# Patient Record
Sex: FEMALE | Race: WHITE | NOT HISPANIC OR LATINO | Employment: OTHER | URBAN - METROPOLITAN AREA
[De-identification: names, ages, dates, MRNs, and addresses within clinical notes are randomized per-mention and may not be internally consistent; named-entity substitution may affect disease eponyms.]

---

## 2017-06-26 ENCOUNTER — OFFICE VISIT (OUTPATIENT)
Dept: INTERNAL MEDICINE | Facility: CLINIC | Age: 82
End: 2017-06-26
Payer: MEDICARE

## 2017-06-26 VITALS
OXYGEN SATURATION: 95 % | TEMPERATURE: 98 F | HEART RATE: 80 BPM | DIASTOLIC BLOOD PRESSURE: 94 MMHG | BODY MASS INDEX: 29.18 KG/M2 | HEIGHT: 66 IN | SYSTOLIC BLOOD PRESSURE: 162 MMHG | WEIGHT: 181.56 LBS

## 2017-06-26 DIAGNOSIS — H91.93 BILATERAL HEARING LOSS, UNSPECIFIED HEARING LOSS TYPE: ICD-10-CM

## 2017-06-26 DIAGNOSIS — E03.8 SUBCLINICAL HYPOTHYROIDISM: ICD-10-CM

## 2017-06-26 DIAGNOSIS — Z76.89 ENCOUNTER TO ESTABLISH CARE: Primary | ICD-10-CM

## 2017-06-26 DIAGNOSIS — R03.0 ELEVATED BLOOD PRESSURE READING: ICD-10-CM

## 2017-06-26 DIAGNOSIS — G25.0 BENIGN HEAD TREMOR: ICD-10-CM

## 2017-06-26 DIAGNOSIS — I10 ESSENTIAL HYPERTENSION: ICD-10-CM

## 2017-06-26 LAB
ALBUMIN SERPL BCP-MCNC: 3.8 G/DL
ALP SERPL-CCNC: 59 U/L
ALT SERPL W/O P-5'-P-CCNC: 14 U/L
ANION GAP SERPL CALC-SCNC: 8 MMOL/L
AST SERPL-CCNC: 24 U/L
BACTERIA #/AREA URNS AUTO: NORMAL /HPF
BASOPHILS # BLD AUTO: 0.05 K/UL
BASOPHILS NFR BLD: 0.7 %
BILIRUB SERPL-MCNC: 0.4 MG/DL
BILIRUB UR QL STRIP: NEGATIVE
BUN SERPL-MCNC: 15 MG/DL
CALCIUM SERPL-MCNC: 9.2 MG/DL
CHLORIDE SERPL-SCNC: 109 MMOL/L
CHOLEST/HDLC SERPL: 2.4 {RATIO}
CLARITY UR REFRACT.AUTO: CLEAR
CO2 SERPL-SCNC: 23 MMOL/L
COLOR UR AUTO: YELLOW
CREAT SERPL-MCNC: 0.9 MG/DL
DIFFERENTIAL METHOD: ABNORMAL
EOSINOPHIL # BLD AUTO: 0.1 K/UL
EOSINOPHIL NFR BLD: 1.9 %
ERYTHROCYTE [DISTWIDTH] IN BLOOD BY AUTOMATED COUNT: 14.9 %
EST. GFR  (AFRICAN AMERICAN): >60 ML/MIN/1.73 M^2
EST. GFR  (NON AFRICAN AMERICAN): 57.3 ML/MIN/1.73 M^2
GLUCOSE SERPL-MCNC: 97 MG/DL
GLUCOSE UR QL STRIP: NEGATIVE
HCT VFR BLD AUTO: 43.2 %
HDL/CHOLESTEROL RATIO: 41.2 %
HDLC SERPL-MCNC: 177 MG/DL
HDLC SERPL-MCNC: 73 MG/DL
HGB BLD-MCNC: 14 G/DL
HGB UR QL STRIP: NEGATIVE
KETONES UR QL STRIP: NEGATIVE
LDLC SERPL CALC-MCNC: 89.2 MG/DL
LEUKOCYTE ESTERASE UR QL STRIP: ABNORMAL
LYMPHOCYTES # BLD AUTO: 1.5 K/UL
LYMPHOCYTES NFR BLD: 21 %
MCH RBC QN AUTO: 29.9 PG
MCHC RBC AUTO-ENTMCNC: 32.4 %
MCV RBC AUTO: 92 FL
MICROSCOPIC COMMENT: NORMAL
MONOCYTES # BLD AUTO: 0.4 K/UL
MONOCYTES NFR BLD: 5.9 %
NEUTROPHILS # BLD AUTO: 4.9 K/UL
NEUTROPHILS NFR BLD: 70.4 %
NITRITE UR QL STRIP: NEGATIVE
NONHDLC SERPL-MCNC: 104 MG/DL
PH UR STRIP: 6 [PH] (ref 5–8)
PLATELET # BLD AUTO: 194 K/UL
PMV BLD AUTO: 11.5 FL
POTASSIUM SERPL-SCNC: 4.4 MMOL/L
PROT SERPL-MCNC: 7 G/DL
PROT UR QL STRIP: NEGATIVE
RBC # BLD AUTO: 4.68 M/UL
RBC #/AREA URNS AUTO: 1 /HPF (ref 0–4)
SODIUM SERPL-SCNC: 140 MMOL/L
SP GR UR STRIP: 1.01 (ref 1–1.03)
SQUAMOUS #/AREA URNS AUTO: 2 /HPF
T4 FREE SERPL-MCNC: 0.83 NG/DL
TRIGL SERPL-MCNC: 74 MG/DL
TSH SERPL DL<=0.005 MIU/L-ACNC: 3.33 UIU/ML
URN SPEC COLLECT METH UR: ABNORMAL
UROBILINOGEN UR STRIP-ACNC: NEGATIVE EU/DL
WBC # BLD AUTO: 6.92 K/UL
WBC #/AREA URNS AUTO: 4 /HPF (ref 0–5)

## 2017-06-26 PROCEDURE — 80061 LIPID PANEL: CPT

## 2017-06-26 PROCEDURE — 99213 OFFICE O/P EST LOW 20 MIN: CPT | Mod: PBBFAC,PO | Performed by: FAMILY MEDICINE

## 2017-06-26 PROCEDURE — 80053 COMPREHEN METABOLIC PANEL: CPT

## 2017-06-26 PROCEDURE — 99214 OFFICE O/P EST MOD 30 MIN: CPT | Mod: S$PBB,,, | Performed by: FAMILY MEDICINE

## 2017-06-26 PROCEDURE — 84439 ASSAY OF FREE THYROXINE: CPT

## 2017-06-26 PROCEDURE — 36415 COLL VENOUS BLD VENIPUNCTURE: CPT

## 2017-06-26 PROCEDURE — 1126F AMNT PAIN NOTED NONE PRSNT: CPT | Mod: ,,, | Performed by: FAMILY MEDICINE

## 2017-06-26 PROCEDURE — 1159F MED LIST DOCD IN RCRD: CPT | Mod: ,,, | Performed by: FAMILY MEDICINE

## 2017-06-26 PROCEDURE — 85025 COMPLETE CBC W/AUTO DIFF WBC: CPT

## 2017-06-26 PROCEDURE — 99999 PR PBB SHADOW E&M-EST. PATIENT-LVL III: CPT | Mod: PBBFAC,,, | Performed by: FAMILY MEDICINE

## 2017-06-26 PROCEDURE — 81001 URINALYSIS AUTO W/SCOPE: CPT

## 2017-06-26 PROCEDURE — 84443 ASSAY THYROID STIM HORMONE: CPT

## 2017-06-26 RX ORDER — ACETAMINOPHEN 325 MG/1
325 TABLET ORAL EVERY 6 HOURS PRN
COMMUNITY

## 2017-06-26 NOTE — PROGRESS NOTES
Subjective:       Patient ID: Francisca Pretty is a 88 y.o. female.    Chief Complaint: No chief complaint on file.    Establish care.  Medical history includes hypertension, subclinical hypothyroidism, hearing loss, head  tremor.  She was prescribed lisinopril 10 mg once a day last visit 1 year ago.  She reports she has not started medication.  She also has a tremor that is stable and does not seem because much of a problem.      Review of Systems   Constitutional: Negative for activity change, appetite change, fatigue and unexpected weight change.   HENT: Positive for hearing loss. Negative for congestion, dental problem, ear pain, sneezing, sore throat, tinnitus and trouble swallowing.    Eyes: Negative for pain, redness, itching and visual disturbance.   Respiratory: Negative for cough, chest tightness, shortness of breath and wheezing.    Cardiovascular: Negative for chest pain, palpitations and leg swelling.   Gastrointestinal: Negative for abdominal distention, abdominal pain, blood in stool, constipation, diarrhea, nausea and vomiting.   Genitourinary: Negative for difficulty urinating, dysuria, frequency, hematuria and urgency.   Musculoskeletal: Positive for arthralgias and neck stiffness. Negative for back pain, joint swelling and neck pain.        Occasional joint discomfort.  occ neck stiffness at bedtime.   Skin: Negative for rash and wound.        She is followed on a regular basis by dermatology   Neurological: Negative for dizziness, tremors, syncope, weakness, light-headedness, numbness and headaches.   Hematological: Negative for adenopathy. Does not bruise/bleed easily.   Psychiatric/Behavioral: Negative for agitation, dysphoric mood and sleep disturbance. The patient is not nervous/anxious.        Objective:      Physical Exam   Constitutional: She is oriented to person, place, and time. She appears well-developed and well-nourished.   HENT:   Right Ear: External ear normal.   Left Ear:  External ear normal.   Nose: Nose normal.   Mouth/Throat: Oropharynx is clear and moist.   Eyes: Conjunctivae and EOM are normal. Pupils are equal, round, and reactive to light.   Neck: Normal range of motion. Neck supple. No thyromegaly present.   Cardiovascular: Normal rate, regular rhythm and normal heart sounds.    No murmur heard.  2+ carotid pulses   Pulmonary/Chest: Effort normal and breath sounds normal. She has no wheezes. She has no rales.   Abdominal: Soft. Bowel sounds are normal. She exhibits no distension and no mass. There is no tenderness.   Musculoskeletal: She exhibits no edema or tenderness.   Lymphadenopathy:     She has no cervical adenopathy.   Neurological: She is alert and oriented to person, place, and time. She has normal reflexes. She displays normal reflexes. No cranial nerve deficit. She exhibits normal muscle tone. Coordination normal.   Mild head tremor intermittent during  examination   Skin: Skin is warm and dry. No rash noted.   Psychiatric: She has a normal mood and affect. Her behavior is normal. Judgment and thought content normal.       Appointment on 01/20/2016   Component Date Value Ref Range Status    Specimen UA 01/20/2016 Urine, Unspecified   Final    Color, UA 01/20/2016 Yellow  Yellow, Straw, Domitila Final    Appearance, UA 01/20/2016 Clear  Clear Final    pH, UA 01/20/2016 6.0  5.0 - 8.0 Final    Specific Gravity, UA 01/20/2016 1.015  1.005 - 1.030 Final    Protein, UA 01/20/2016 Negative  Negative Final    Glucose, UA 01/20/2016 Negative  Negative Final    Ketones, UA 01/20/2016 Negative  Negative Final    Bilirubin (UA) 01/20/2016 Negative  Negative Final    Occult Blood UA 01/20/2016 Negative  Negative Final    Nitrite, UA 01/20/2016 Negative  Negative Final    Urobilinogen, UA 01/20/2016 Negative  <2.0 EU/dL Final    Leukocytes, UA 01/20/2016 3+* Negative Final    WBC, UA 01/20/2016 2  0 - 5 /hpf Final    Squam Epithel, UA 01/20/2016 2  /hpf Final     Non-Squam Epith 01/20/2016 2* <1/hpf /hpf Final    Microscopic Comment 01/20/2016 SEE COMMENT   Final     Assessment:       1. Essential hypertension    2. Subclinical hypothyroidism        Plan:   She declined any further pneumococcal immunization.  She reports she was ill after the last immunization for pneumococcus.  CBC urinalysis CMP lipids TSH free T4 was ordered.  Follow-up in 2 weeks for blood pressure recheck.  Discussed starting lisinopril 10 mg daily      Essential hypertension  -     CBC auto differential  -     Urinalysis  -     Comprehensive metabolic panel  -     Lipid panel  -     TSH    Subclinical hypothyroidism  -     T4, free

## 2017-06-27 ENCOUNTER — TELEPHONE (OUTPATIENT)
Dept: INTERNAL MEDICINE | Facility: CLINIC | Age: 82
End: 2017-06-27

## 2017-06-27 NOTE — TELEPHONE ENCOUNTER
----- Message from Fermín Serrano MD sent at 6/27/2017  7:44 AM CDT -----  Decreased renal function has improved from previous test.  She needs to avoid naproxen and ibuprofen.  Tylenol  is okay.  Rest of lab is normal

## 2017-07-10 ENCOUNTER — OFFICE VISIT (OUTPATIENT)
Dept: INTERNAL MEDICINE | Facility: CLINIC | Age: 82
End: 2017-07-10
Payer: MEDICARE

## 2017-07-10 VITALS
OXYGEN SATURATION: 95 % | DIASTOLIC BLOOD PRESSURE: 90 MMHG | HEART RATE: 72 BPM | WEIGHT: 178.44 LBS | TEMPERATURE: 98 F | SYSTOLIC BLOOD PRESSURE: 150 MMHG | BODY MASS INDEX: 28.68 KG/M2 | HEIGHT: 66 IN

## 2017-07-10 DIAGNOSIS — R03.0 ELEVATED BLOOD PRESSURE READING: ICD-10-CM

## 2017-07-10 DIAGNOSIS — I10 ESSENTIAL HYPERTENSION: Primary | ICD-10-CM

## 2017-07-10 PROCEDURE — 99213 OFFICE O/P EST LOW 20 MIN: CPT | Mod: PBBFAC,PO | Performed by: FAMILY MEDICINE

## 2017-07-10 PROCEDURE — 99999 PR PBB SHADOW E&M-EST. PATIENT-LVL III: CPT | Mod: PBBFAC,,, | Performed by: FAMILY MEDICINE

## 2017-07-10 PROCEDURE — 1159F MED LIST DOCD IN RCRD: CPT | Mod: ,,, | Performed by: FAMILY MEDICINE

## 2017-07-10 PROCEDURE — 1126F AMNT PAIN NOTED NONE PRSNT: CPT | Mod: ,,, | Performed by: FAMILY MEDICINE

## 2017-07-10 PROCEDURE — 99213 OFFICE O/P EST LOW 20 MIN: CPT | Mod: S$PBB,,, | Performed by: FAMILY MEDICINE

## 2017-07-10 NOTE — PROGRESS NOTES
Subjective:       Patient ID: Francisca Pretty is a 88 y.o. female.    Chief Complaint: Follow-up    Follow-up hypertension.  Blood pressures at home have mostly been in 1 4150 systolic range.  She is on lisinopril 10 mg a day.  She denies headaches chest pain palpitations or edema.      Review of Systems   Constitutional: Negative for appetite change, fatigue and unexpected weight change.   Respiratory: Negative for cough, shortness of breath and wheezing.    Cardiovascular: Negative for chest pain, palpitations and leg swelling.   Gastrointestinal: Negative for abdominal pain.   Genitourinary: Negative for difficulty urinating.   Neurological: Negative for headaches.       Objective:      Physical Exam   Constitutional: She is oriented to person, place, and time. She appears well-developed and well-nourished. No distress.   Neck: Neck supple. No thyromegaly present.   Cardiovascular: Normal rate, regular rhythm and normal heart sounds.    No murmur heard.  Pulmonary/Chest: Effort normal and breath sounds normal. No respiratory distress. She has no wheezes.   Abdominal: Soft. Bowel sounds are normal. She exhibits no mass. There is no tenderness.   Lymphadenopathy:     She has no cervical adenopathy.   Neurological: She is alert and oriented to person, place, and time.       Office Visit on 06/26/2017   Component Date Value Ref Range Status    WBC 06/26/2017 6.92  3.90 - 12.70 K/uL Final    RBC 06/26/2017 4.68  4.00 - 5.40 M/uL Final    Hemoglobin 06/26/2017 14.0  12.0 - 16.0 g/dL Final    Hematocrit 06/26/2017 43.2  37.0 - 48.5 % Final    MCV 06/26/2017 92  82 - 98 fL Final    MCH 06/26/2017 29.9  27.0 - 31.0 pg Final    MCHC 06/26/2017 32.4  32.0 - 36.0 % Final    RDW 06/26/2017 14.9* 11.5 - 14.5 % Final    Platelets 06/26/2017 194  150 - 350 K/uL Final    MPV 06/26/2017 11.5  9.2 - 12.9 fL Final    Gran # 06/26/2017 4.9  1.8 - 7.7 K/uL Final    Lymph # 06/26/2017 1.5  1.0 - 4.8 K/uL Final    Mono  # 06/26/2017 0.4  0.3 - 1.0 K/uL Final    Eos # 06/26/2017 0.1  0.0 - 0.5 K/uL Final    Baso # 06/26/2017 0.05  0.00 - 0.20 K/uL Final    Gran% 06/26/2017 70.4  38.0 - 73.0 % Final    Lymph% 06/26/2017 21.0  18.0 - 48.0 % Final    Mono% 06/26/2017 5.9  4.0 - 15.0 % Final    Eosinophil% 06/26/2017 1.9  0.0 - 8.0 % Final    Basophil% 06/26/2017 0.7  0.0 - 1.9 % Final    Differential Method 06/26/2017 Automated   Final    Specimen UA 06/26/2017 Urine, Clean Catch   Final    Color, UA 06/26/2017 Yellow  Yellow, Straw, Domitila Final    Appearance, UA 06/26/2017 Clear  Clear Final    pH, UA 06/26/2017 6.0  5.0 - 8.0 Final    Specific Gravity, UA 06/26/2017 1.015  1.005 - 1.030 Final    Protein, UA 06/26/2017 Negative  Negative Final    Glucose, UA 06/26/2017 Negative  Negative Final    Ketones, UA 06/26/2017 Negative  Negative Final    Bilirubin (UA) 06/26/2017 Negative  Negative Final    Occult Blood UA 06/26/2017 Negative  Negative Final    Nitrite, UA 06/26/2017 Negative  Negative Final    Urobilinogen, UA 06/26/2017 Negative  <2.0 EU/dL Final    Leukocytes, UA 06/26/2017 2+* Negative Final    Sodium 06/26/2017 140  136 - 145 mmol/L Final    Potassium 06/26/2017 4.4  3.5 - 5.1 mmol/L Final    Chloride 06/26/2017 109  95 - 110 mmol/L Final    CO2 06/26/2017 23  23 - 29 mmol/L Final    Glucose 06/26/2017 97  70 - 110 mg/dL Final    BUN, Bld 06/26/2017 15  8 - 23 mg/dL Final    Creatinine 06/26/2017 0.9  0.5 - 1.4 mg/dL Final    Calcium 06/26/2017 9.2  8.7 - 10.5 mg/dL Final    Total Protein 06/26/2017 7.0  6.0 - 8.4 g/dL Final    Albumin 06/26/2017 3.8  3.5 - 5.2 g/dL Final    Total Bilirubin 06/26/2017 0.4  0.1 - 1.0 mg/dL Final    Alkaline Phosphatase 06/26/2017 59  55 - 135 U/L Final    AST 06/26/2017 24  10 - 40 U/L Final    ALT 06/26/2017 14  10 - 44 U/L Final    Anion Gap 06/26/2017 8  8 - 16 mmol/L Final    eGFR if African American 06/26/2017 >60.0  >60 mL/min/1.73 m^2 Final     eGFR if non African American 06/26/2017 57.3* >60 mL/min/1.73 m^2 Final    Cholesterol 06/26/2017 177  120 - 199 mg/dL Final    Triglycerides 06/26/2017 74  30 - 150 mg/dL Final    HDL 06/26/2017 73  40 - 75 mg/dL Final    LDL Cholesterol 06/26/2017 89.2  63.0 - 159.0 mg/dL Final    HDL/Chol Ratio 06/26/2017 41.2  20.0 - 50.0 % Final    Total Cholesterol/HDL Ratio 06/26/2017 2.4  2.0 - 5.0 Final    Non-HDL Cholesterol 06/26/2017 104  mg/dL Final    TSH 06/26/2017 3.326  0.400 - 4.000 uIU/mL Final    Free T4 06/26/2017 0.83  0.71 - 1.51 ng/dL Final    RBC, UA 06/26/2017 1  0 - 4 /hpf Final    WBC, UA 06/26/2017 4  0 - 5 /hpf Final    Bacteria, UA 06/26/2017 Rare  None-Occ /hpf Final    Squam Epithel, UA 06/26/2017 2  /hpf Final    Microscopic Comment 06/26/2017 SEE COMMENT   Final     Assessment:       No diagnosis found.    Plan:     Lisinopril 20 mg a day and follow-up in one month.  Continue home blood pressure monitor    There are no diagnoses linked to this encounter.

## 2017-08-11 ENCOUNTER — OFFICE VISIT (OUTPATIENT)
Dept: INTERNAL MEDICINE | Facility: CLINIC | Age: 82
End: 2017-08-11
Payer: MEDICARE

## 2017-08-11 VITALS
SYSTOLIC BLOOD PRESSURE: 122 MMHG | WEIGHT: 178.44 LBS | OXYGEN SATURATION: 96 % | TEMPERATURE: 98 F | BODY MASS INDEX: 28.8 KG/M2 | HEART RATE: 82 BPM | DIASTOLIC BLOOD PRESSURE: 88 MMHG

## 2017-08-11 DIAGNOSIS — I10 ESSENTIAL HYPERTENSION: Primary | ICD-10-CM

## 2017-08-11 DIAGNOSIS — R05.9 COUGH: ICD-10-CM

## 2017-08-11 PROCEDURE — 99999 PR PBB SHADOW E&M-EST. PATIENT-LVL III: CPT | Mod: PBBFAC,,, | Performed by: FAMILY MEDICINE

## 2017-08-11 PROCEDURE — 99213 OFFICE O/P EST LOW 20 MIN: CPT | Mod: PBBFAC,PO | Performed by: FAMILY MEDICINE

## 2017-08-11 PROCEDURE — 1159F MED LIST DOCD IN RCRD: CPT | Mod: ,,, | Performed by: FAMILY MEDICINE

## 2017-08-11 PROCEDURE — 3008F BODY MASS INDEX DOCD: CPT | Mod: ,,, | Performed by: FAMILY MEDICINE

## 2017-08-11 PROCEDURE — 99213 OFFICE O/P EST LOW 20 MIN: CPT | Mod: S$PBB,,, | Performed by: FAMILY MEDICINE

## 2017-08-11 PROCEDURE — 1125F AMNT PAIN NOTED PAIN PRSNT: CPT | Mod: ,,, | Performed by: FAMILY MEDICINE

## 2017-08-11 NOTE — PROGRESS NOTES
Subjective:       Patient ID: Francisca Pretty is a 88 y.o. female.    Chief Complaint: 1 month f/u (HBP)    Follow-up hypertension.  She is on lisinopril 20 mg a day.she has an occasional dry cough with occasional sneezing sometimes precipitated by eating  chocolate.  She denies difficulty breathing chest pain palpitations or urticaria      Review of Systems   Constitutional: Negative for appetite change, fatigue and unexpected weight change.   HENT: Positive for sneezing.    Respiratory: Positive for cough. Negative for shortness of breath and wheezing.    Cardiovascular: Negative for chest pain, palpitations and leg swelling.   Gastrointestinal: Negative for abdominal pain.   Genitourinary: Negative for difficulty urinating.   Neurological: Negative for headaches.       Objective:      Physical Exam   Constitutional: She is oriented to person, place, and time. She appears well-developed and well-nourished. No distress.   Neck: Neck supple. No thyromegaly present.   Cardiovascular: Normal rate, regular rhythm and normal heart sounds.    No murmur heard.  Pulmonary/Chest: Effort normal and breath sounds normal. No respiratory distress. She has no wheezes.   Abdominal: Soft. Bowel sounds are normal. She exhibits no mass. There is no tenderness.   Lymphadenopathy:     She has no cervical adenopathy.   Neurological: She is alert and oriented to person, place, and time.       Office Visit on 06/26/2017   Component Date Value Ref Range Status    WBC 06/26/2017 6.92  3.90 - 12.70 K/uL Final    RBC 06/26/2017 4.68  4.00 - 5.40 M/uL Final    Hemoglobin 06/26/2017 14.0  12.0 - 16.0 g/dL Final    Hematocrit 06/26/2017 43.2  37.0 - 48.5 % Final    MCV 06/26/2017 92  82 - 98 fL Final    MCH 06/26/2017 29.9  27.0 - 31.0 pg Final    MCHC 06/26/2017 32.4  32.0 - 36.0 % Final    RDW 06/26/2017 14.9* 11.5 - 14.5 % Final    Platelets 06/26/2017 194  150 - 350 K/uL Final    MPV 06/26/2017 11.5  9.2 - 12.9 fL Final     Gran # 06/26/2017 4.9  1.8 - 7.7 K/uL Final    Lymph # 06/26/2017 1.5  1.0 - 4.8 K/uL Final    Mono # 06/26/2017 0.4  0.3 - 1.0 K/uL Final    Eos # 06/26/2017 0.1  0.0 - 0.5 K/uL Final    Baso # 06/26/2017 0.05  0.00 - 0.20 K/uL Final    Gran% 06/26/2017 70.4  38.0 - 73.0 % Final    Lymph% 06/26/2017 21.0  18.0 - 48.0 % Final    Mono% 06/26/2017 5.9  4.0 - 15.0 % Final    Eosinophil% 06/26/2017 1.9  0.0 - 8.0 % Final    Basophil% 06/26/2017 0.7  0.0 - 1.9 % Final    Differential Method 06/26/2017 Automated   Final    Specimen UA 06/26/2017 Urine, Clean Catch   Final    Color, UA 06/26/2017 Yellow  Yellow, Straw, Domitila Final    Appearance, UA 06/26/2017 Clear  Clear Final    pH, UA 06/26/2017 6.0  5.0 - 8.0 Final    Specific Gravity, UA 06/26/2017 1.015  1.005 - 1.030 Final    Protein, UA 06/26/2017 Negative  Negative Final    Glucose, UA 06/26/2017 Negative  Negative Final    Ketones, UA 06/26/2017 Negative  Negative Final    Bilirubin (UA) 06/26/2017 Negative  Negative Final    Occult Blood UA 06/26/2017 Negative  Negative Final    Nitrite, UA 06/26/2017 Negative  Negative Final    Urobilinogen, UA 06/26/2017 Negative  <2.0 EU/dL Final    Leukocytes, UA 06/26/2017 2+* Negative Final    Sodium 06/26/2017 140  136 - 145 mmol/L Final    Potassium 06/26/2017 4.4  3.5 - 5.1 mmol/L Final    Chloride 06/26/2017 109  95 - 110 mmol/L Final    CO2 06/26/2017 23  23 - 29 mmol/L Final    Glucose 06/26/2017 97  70 - 110 mg/dL Final    BUN, Bld 06/26/2017 15  8 - 23 mg/dL Final    Creatinine 06/26/2017 0.9  0.5 - 1.4 mg/dL Final    Calcium 06/26/2017 9.2  8.7 - 10.5 mg/dL Final    Total Protein 06/26/2017 7.0  6.0 - 8.4 g/dL Final    Albumin 06/26/2017 3.8  3.5 - 5.2 g/dL Final    Total Bilirubin 06/26/2017 0.4  0.1 - 1.0 mg/dL Final    Alkaline Phosphatase 06/26/2017 59  55 - 135 U/L Final    AST 06/26/2017 24  10 - 40 U/L Final    ALT 06/26/2017 14  10 - 44 U/L Final    Anion Gap  06/26/2017 8  8 - 16 mmol/L Final    eGFR if African American 06/26/2017 >60.0  >60 mL/min/1.73 m^2 Final    eGFR if non African American 06/26/2017 57.3* >60 mL/min/1.73 m^2 Final    Cholesterol 06/26/2017 177  120 - 199 mg/dL Final    Triglycerides 06/26/2017 74  30 - 150 mg/dL Final    HDL 06/26/2017 73  40 - 75 mg/dL Final    LDL Cholesterol 06/26/2017 89.2  63.0 - 159.0 mg/dL Final    HDL/Chol Ratio 06/26/2017 41.2  20.0 - 50.0 % Final    Total Cholesterol/HDL Ratio 06/26/2017 2.4  2.0 - 5.0 Final    Non-HDL Cholesterol 06/26/2017 104  mg/dL Final    TSH 06/26/2017 3.326  0.400 - 4.000 uIU/mL Final    Free T4 06/26/2017 0.83  0.71 - 1.51 ng/dL Final    RBC, UA 06/26/2017 1  0 - 4 /hpf Final    WBC, UA 06/26/2017 4  0 - 5 /hpf Final    Bacteria, UA 06/26/2017 Rare  None-Occ /hpf Final    Squam Epithel, UA 06/26/2017 2  /hpf Final    Microscopic Comment 06/26/2017 SEE COMMENT   Final     Assessment:       No diagnosis found.    Plan:     Blood pressure is controlled we'll continue lisinopril.  Consider Allegra for nasal congestion and  cough.  Symptoms sound more ALLERGIC than lisinopril side effect.  Follow-up in 3 month    There are no diagnoses linked to this encounter.

## 2017-08-29 ENCOUNTER — OFFICE VISIT (OUTPATIENT)
Dept: INTERNAL MEDICINE | Facility: CLINIC | Age: 82
End: 2017-08-29
Payer: MEDICARE

## 2017-08-29 VITALS
TEMPERATURE: 98 F | OXYGEN SATURATION: 97 % | HEIGHT: 66 IN | DIASTOLIC BLOOD PRESSURE: 82 MMHG | WEIGHT: 177.25 LBS | SYSTOLIC BLOOD PRESSURE: 132 MMHG | HEART RATE: 77 BPM | BODY MASS INDEX: 28.49 KG/M2

## 2017-08-29 DIAGNOSIS — M19.011 PRIMARY OSTEOARTHRITIS OF RIGHT SHOULDER: Primary | ICD-10-CM

## 2017-08-29 DIAGNOSIS — I10 ESSENTIAL HYPERTENSION: ICD-10-CM

## 2017-08-29 PROBLEM — R03.0 ELEVATED BLOOD PRESSURE READING: Status: RESOLVED | Noted: 2017-06-26 | Resolved: 2017-08-29

## 2017-08-29 PROCEDURE — 99213 OFFICE O/P EST LOW 20 MIN: CPT | Mod: S$PBB,,, | Performed by: FAMILY MEDICINE

## 2017-08-29 PROCEDURE — 99214 OFFICE O/P EST MOD 30 MIN: CPT | Mod: PBBFAC,PO | Performed by: FAMILY MEDICINE

## 2017-08-29 PROCEDURE — 1125F AMNT PAIN NOTED PAIN PRSNT: CPT | Mod: ,,, | Performed by: FAMILY MEDICINE

## 2017-08-29 PROCEDURE — 99999 PR PBB SHADOW E&M-EST. PATIENT-LVL IV: CPT | Mod: PBBFAC,,, | Performed by: FAMILY MEDICINE

## 2017-08-29 PROCEDURE — 1159F MED LIST DOCD IN RCRD: CPT | Mod: ,,, | Performed by: FAMILY MEDICINE

## 2017-08-29 PROCEDURE — 96372 THER/PROPH/DIAG INJ SC/IM: CPT | Mod: PBBFAC,PO

## 2017-08-29 RX ORDER — DEXAMETHASONE SODIUM PHOSPHATE 100 MG/10ML
10 INJECTION INTRAMUSCULAR; INTRAVENOUS ONCE
Status: COMPLETED | OUTPATIENT
Start: 2017-08-29 | End: 2017-08-29

## 2017-08-29 RX ORDER — MELOXICAM 15 MG/1
15 TABLET ORAL DAILY
Qty: 30 TABLET | Refills: 0 | Status: SHIPPED | OUTPATIENT
Start: 2017-08-29 | End: 2017-11-08

## 2017-08-29 RX ADMIN — DEXAMETHASONE SODIUM PHOSPHATE 10 MG: 10 INJECTION INTRAMUSCULAR; INTRAVENOUS at 10:08

## 2017-08-29 NOTE — PROGRESS NOTES
Subjective:       Patient ID: Francisca Pretty is a 88 y.o. female.    Chief Complaint: Shoulder Pain    6 weeks duration of right more than left shoulder pain.  The onset was about the time she started helping her  in regards to his recent hospital admission.  She reports her shoulders hurt in the morning when she gets up and better after moving around.  She also reports that water aerobics with range of motion seems to relieve the discomfort.  She gets temporary relief with Tylenol.  She denies any trauma.      Shoulder Pain    Pertinent negatives include no headaches or numbness.     Review of Systems   Constitutional: Negative for chills and fatigue.   Respiratory: Negative for cough and shortness of breath.    Cardiovascular: Negative for chest pain and palpitations.   Gastrointestinal: Negative for abdominal pain.   Musculoskeletal: Positive for arthralgias.   Neurological: Negative for weakness, numbness and headaches.       Objective:      Physical Exam   Constitutional: She appears well-developed and well-nourished. No distress.   Cardiovascular: Normal rate and regular rhythm.    Pulmonary/Chest: Effort normal and breath sounds normal.   Musculoskeletal:   Moderate pain with range of motion of her right shoulder.  She has full range of motion.  There is no direct tenderness.  No bone deformity or crepitus.  She has some discomfort with range of motion of the left shoulder.  Range of motion is full.       Office Visit on 06/26/2017   Component Date Value Ref Range Status    WBC 06/26/2017 6.92  3.90 - 12.70 K/uL Final    RBC 06/26/2017 4.68  4.00 - 5.40 M/uL Final    Hemoglobin 06/26/2017 14.0  12.0 - 16.0 g/dL Final    Hematocrit 06/26/2017 43.2  37.0 - 48.5 % Final    MCV 06/26/2017 92  82 - 98 fL Final    MCH 06/26/2017 29.9  27.0 - 31.0 pg Final    MCHC 06/26/2017 32.4  32.0 - 36.0 % Final    RDW 06/26/2017 14.9* 11.5 - 14.5 % Final    Platelets 06/26/2017 194  150 - 350 K/uL Final     MPV 06/26/2017 11.5  9.2 - 12.9 fL Final    Gran # 06/26/2017 4.9  1.8 - 7.7 K/uL Final    Lymph # 06/26/2017 1.5  1.0 - 4.8 K/uL Final    Mono # 06/26/2017 0.4  0.3 - 1.0 K/uL Final    Eos # 06/26/2017 0.1  0.0 - 0.5 K/uL Final    Baso # 06/26/2017 0.05  0.00 - 0.20 K/uL Final    Gran% 06/26/2017 70.4  38.0 - 73.0 % Final    Lymph% 06/26/2017 21.0  18.0 - 48.0 % Final    Mono% 06/26/2017 5.9  4.0 - 15.0 % Final    Eosinophil% 06/26/2017 1.9  0.0 - 8.0 % Final    Basophil% 06/26/2017 0.7  0.0 - 1.9 % Final    Differential Method 06/26/2017 Automated   Final    Specimen UA 06/26/2017 Urine, Clean Catch   Final    Color, UA 06/26/2017 Yellow  Yellow, Straw, Domitila Final    Appearance, UA 06/26/2017 Clear  Clear Final    pH, UA 06/26/2017 6.0  5.0 - 8.0 Final    Specific Gravity, UA 06/26/2017 1.015  1.005 - 1.030 Final    Protein, UA 06/26/2017 Negative  Negative Final    Glucose, UA 06/26/2017 Negative  Negative Final    Ketones, UA 06/26/2017 Negative  Negative Final    Bilirubin (UA) 06/26/2017 Negative  Negative Final    Occult Blood UA 06/26/2017 Negative  Negative Final    Nitrite, UA 06/26/2017 Negative  Negative Final    Urobilinogen, UA 06/26/2017 Negative  <2.0 EU/dL Final    Leukocytes, UA 06/26/2017 2+* Negative Final    Sodium 06/26/2017 140  136 - 145 mmol/L Final    Potassium 06/26/2017 4.4  3.5 - 5.1 mmol/L Final    Chloride 06/26/2017 109  95 - 110 mmol/L Final    CO2 06/26/2017 23  23 - 29 mmol/L Final    Glucose 06/26/2017 97  70 - 110 mg/dL Final    BUN, Bld 06/26/2017 15  8 - 23 mg/dL Final    Creatinine 06/26/2017 0.9  0.5 - 1.4 mg/dL Final    Calcium 06/26/2017 9.2  8.7 - 10.5 mg/dL Final    Total Protein 06/26/2017 7.0  6.0 - 8.4 g/dL Final    Albumin 06/26/2017 3.8  3.5 - 5.2 g/dL Final    Total Bilirubin 06/26/2017 0.4  0.1 - 1.0 mg/dL Final    Alkaline Phosphatase 06/26/2017 59  55 - 135 U/L Final    AST 06/26/2017 24  10 - 40 U/L Final    ALT  06/26/2017 14  10 - 44 U/L Final    Anion Gap 06/26/2017 8  8 - 16 mmol/L Final    eGFR if African American 06/26/2017 >60.0  >60 mL/min/1.73 m^2 Final    eGFR if non African American 06/26/2017 57.3* >60 mL/min/1.73 m^2 Final    Cholesterol 06/26/2017 177  120 - 199 mg/dL Final    Triglycerides 06/26/2017 74  30 - 150 mg/dL Final    HDL 06/26/2017 73  40 - 75 mg/dL Final    LDL Cholesterol 06/26/2017 89.2  63.0 - 159.0 mg/dL Final    HDL/Chol Ratio 06/26/2017 41.2  20.0 - 50.0 % Final    Total Cholesterol/HDL Ratio 06/26/2017 2.4  2.0 - 5.0 Final    Non-HDL Cholesterol 06/26/2017 104  mg/dL Final    TSH 06/26/2017 3.326  0.400 - 4.000 uIU/mL Final    Free T4 06/26/2017 0.83  0.71 - 1.51 ng/dL Final    RBC, UA 06/26/2017 1  0 - 4 /hpf Final    WBC, UA 06/26/2017 4  0 - 5 /hpf Final    Bacteria, UA 06/26/2017 Rare  None-Occ /hpf Final    Squam Epithel, UA 06/26/2017 2  /hpf Final    Microscopic Comment 06/26/2017 SEE COMMENT   Final     Assessment:       1. Primary osteoarthritis of right shoulder        Plan:     Probable degenerative arthritis.  Decadron injection followed by meloxicam.  Heat  twice a day to  Shoulder.  Continue water range of motion but avoid lifting and straining.  Follow-up in 2 weeks.  Blood pressure is normal and continued medication    Primary osteoarthritis of right shoulder  -     dexamethasone injection 10 mg; Inject 1 mL (10 mg total) into the muscle once.  -     meloxicam (MOBIC) 15 MG tablet; Take 1 tablet (15 mg total) by mouth once daily.  Dispense: 30 tablet; Refill: 0

## 2017-09-11 ENCOUNTER — APPOINTMENT (OUTPATIENT)
Dept: RADIOLOGY | Facility: HOSPITAL | Age: 82
End: 2017-09-11
Attending: FAMILY MEDICINE
Payer: MEDICARE

## 2017-09-11 ENCOUNTER — OFFICE VISIT (OUTPATIENT)
Dept: INTERNAL MEDICINE | Facility: CLINIC | Age: 82
End: 2017-09-11
Payer: MEDICARE

## 2017-09-11 VITALS
BODY MASS INDEX: 29.2 KG/M2 | WEIGHT: 181.69 LBS | SYSTOLIC BLOOD PRESSURE: 170 MMHG | DIASTOLIC BLOOD PRESSURE: 98 MMHG | TEMPERATURE: 98 F | OXYGEN SATURATION: 96 % | HEART RATE: 77 BPM | HEIGHT: 66 IN

## 2017-09-11 DIAGNOSIS — M19.011 PRIMARY OSTEOARTHRITIS OF BOTH SHOULDERS: ICD-10-CM

## 2017-09-11 DIAGNOSIS — M25.512 ACUTE PAIN OF LEFT SHOULDER: Primary | ICD-10-CM

## 2017-09-11 DIAGNOSIS — M19.012 PRIMARY OSTEOARTHRITIS OF BOTH SHOULDERS: ICD-10-CM

## 2017-09-11 PROCEDURE — 73030 X-RAY EXAM OF SHOULDER: CPT | Mod: TC,50,PO

## 2017-09-11 PROCEDURE — 20610 DRAIN/INJ JOINT/BURSA W/O US: CPT | Mod: PBBFAC,PO | Performed by: FAMILY MEDICINE

## 2017-09-11 PROCEDURE — 99999 PR PBB SHADOW E&M-EST. PATIENT-LVL III: CPT | Mod: PBBFAC,,, | Performed by: FAMILY MEDICINE

## 2017-09-11 PROCEDURE — 1125F AMNT PAIN NOTED PAIN PRSNT: CPT | Mod: ,,, | Performed by: FAMILY MEDICINE

## 2017-09-11 PROCEDURE — 99213 OFFICE O/P EST LOW 20 MIN: CPT | Mod: 25,S$PBB,, | Performed by: FAMILY MEDICINE

## 2017-09-11 PROCEDURE — 73030 X-RAY EXAM OF SHOULDER: CPT | Mod: 26,50,, | Performed by: RADIOLOGY

## 2017-09-11 PROCEDURE — 99213 OFFICE O/P EST LOW 20 MIN: CPT | Mod: PBBFAC,PO | Performed by: FAMILY MEDICINE

## 2017-09-11 PROCEDURE — 1159F MED LIST DOCD IN RCRD: CPT | Mod: ,,, | Performed by: FAMILY MEDICINE

## 2017-09-11 PROCEDURE — 20610 DRAIN/INJ JOINT/BURSA W/O US: CPT | Mod: S$PBB,LT,, | Performed by: FAMILY MEDICINE

## 2017-09-11 RX ORDER — METHYLPREDNISOLONE ACETATE 40 MG/ML
60 INJECTION, SUSPENSION INTRA-ARTICULAR; INTRALESIONAL; INTRAMUSCULAR; SOFT TISSUE ONCE
Status: COMPLETED | OUTPATIENT
Start: 2017-09-11 | End: 2017-09-11

## 2017-09-11 RX ADMIN — METHYLPREDNISOLONE ACETATE 60 MG: 40 INJECTION, SUSPENSION INTRALESIONAL; INTRAMUSCULAR; INTRASYNOVIAL; SOFT TISSUE at 12:09

## 2017-09-11 NOTE — PROGRESS NOTES
Subjective:       Patient ID: Francisca Pretty is a 88 y.o. female.    Chief Complaint: No chief complaint on file.    She continues with bilateral shoulder pain left side worse than right.  The onset was related to helping  her  is recovering from hip surgery.  She denies any traumatic events.  She reports that Decadron injection meloxicam did not help      Review of Systems   Constitutional: Negative for chills and fever.   Respiratory: Negative for cough.    Cardiovascular: Negative for chest pain and palpitations.   Musculoskeletal: Positive for arthralgias.       Objective:      Physical Exam   Constitutional: She appears well-developed and well-nourished. No distress.   Musculoskeletal:   Range of motion of both shoulders is full.  She has more pain on left shoulder with range of motion than right shoulder.  No bony deformity present.  No redness or swelling.       Office Visit on 06/26/2017   Component Date Value Ref Range Status    WBC 06/26/2017 6.92  3.90 - 12.70 K/uL Final    RBC 06/26/2017 4.68  4.00 - 5.40 M/uL Final    Hemoglobin 06/26/2017 14.0  12.0 - 16.0 g/dL Final    Hematocrit 06/26/2017 43.2  37.0 - 48.5 % Final    MCV 06/26/2017 92  82 - 98 fL Final    MCH 06/26/2017 29.9  27.0 - 31.0 pg Final    MCHC 06/26/2017 32.4  32.0 - 36.0 % Final    RDW 06/26/2017 14.9* 11.5 - 14.5 % Final    Platelets 06/26/2017 194  150 - 350 K/uL Final    MPV 06/26/2017 11.5  9.2 - 12.9 fL Final    Gran # 06/26/2017 4.9  1.8 - 7.7 K/uL Final    Lymph # 06/26/2017 1.5  1.0 - 4.8 K/uL Final    Mono # 06/26/2017 0.4  0.3 - 1.0 K/uL Final    Eos # 06/26/2017 0.1  0.0 - 0.5 K/uL Final    Baso # 06/26/2017 0.05  0.00 - 0.20 K/uL Final    Gran% 06/26/2017 70.4  38.0 - 73.0 % Final    Lymph% 06/26/2017 21.0  18.0 - 48.0 % Final    Mono% 06/26/2017 5.9  4.0 - 15.0 % Final    Eosinophil% 06/26/2017 1.9  0.0 - 8.0 % Final    Basophil% 06/26/2017 0.7  0.0 - 1.9 % Final    Differential Method  06/26/2017 Automated   Final    Specimen UA 06/26/2017 Urine, Clean Catch   Final    Color, UA 06/26/2017 Yellow  Yellow, Straw, Domitila Final    Appearance, UA 06/26/2017 Clear  Clear Final    pH, UA 06/26/2017 6.0  5.0 - 8.0 Final    Specific Gravity, UA 06/26/2017 1.015  1.005 - 1.030 Final    Protein, UA 06/26/2017 Negative  Negative Final    Glucose, UA 06/26/2017 Negative  Negative Final    Ketones, UA 06/26/2017 Negative  Negative Final    Bilirubin (UA) 06/26/2017 Negative  Negative Final    Occult Blood UA 06/26/2017 Negative  Negative Final    Nitrite, UA 06/26/2017 Negative  Negative Final    Urobilinogen, UA 06/26/2017 Negative  <2.0 EU/dL Final    Leukocytes, UA 06/26/2017 2+* Negative Final    Sodium 06/26/2017 140  136 - 145 mmol/L Final    Potassium 06/26/2017 4.4  3.5 - 5.1 mmol/L Final    Chloride 06/26/2017 109  95 - 110 mmol/L Final    CO2 06/26/2017 23  23 - 29 mmol/L Final    Glucose 06/26/2017 97  70 - 110 mg/dL Final    BUN, Bld 06/26/2017 15  8 - 23 mg/dL Final    Creatinine 06/26/2017 0.9  0.5 - 1.4 mg/dL Final    Calcium 06/26/2017 9.2  8.7 - 10.5 mg/dL Final    Total Protein 06/26/2017 7.0  6.0 - 8.4 g/dL Final    Albumin 06/26/2017 3.8  3.5 - 5.2 g/dL Final    Total Bilirubin 06/26/2017 0.4  0.1 - 1.0 mg/dL Final    Alkaline Phosphatase 06/26/2017 59  55 - 135 U/L Final    AST 06/26/2017 24  10 - 40 U/L Final    ALT 06/26/2017 14  10 - 44 U/L Final    Anion Gap 06/26/2017 8  8 - 16 mmol/L Final    eGFR if African American 06/26/2017 >60.0  >60 mL/min/1.73 m^2 Final    eGFR if non African American 06/26/2017 57.3* >60 mL/min/1.73 m^2 Final    Cholesterol 06/26/2017 177  120 - 199 mg/dL Final    Triglycerides 06/26/2017 74  30 - 150 mg/dL Final    HDL 06/26/2017 73  40 - 75 mg/dL Final    LDL Cholesterol 06/26/2017 89.2  63.0 - 159.0 mg/dL Final    HDL/Chol Ratio 06/26/2017 41.2  20.0 - 50.0 % Final    Total Cholesterol/HDL Ratio 06/26/2017 2.4  2.0 - 5.0  Final    Non-HDL Cholesterol 06/26/2017 104  mg/dL Final    TSH 06/26/2017 3.326  0.400 - 4.000 uIU/mL Final    Free T4 06/26/2017 0.83  0.71 - 1.51 ng/dL Final    RBC, UA 06/26/2017 1  0 - 4 /hpf Final    WBC, UA 06/26/2017 4  0 - 5 /hpf Final    Bacteria, UA 06/26/2017 Rare  None-Occ /hpf Final    Squam Epithel, UA 06/26/2017 2  /hpf Final    Microscopic Comment 06/26/2017 SEE COMMENT   Final     Assessment:       1. Primary osteoarthritis of both shoulders        Plan:   Xray shoulders  .  Alcohol Prep and injected left anterior shoulder 60 mg Depo-Medrol and 1 cc of Xylocaine.  This was done with no difficulty.  Recommend heat range of motion of both shoulders and follow-up in one week      Primary osteoarthritis of both shoulders  -     X-Ray Shoulder 2 or More views Bilateral; Future; Expected date: 09/11/2017

## 2017-09-18 ENCOUNTER — OFFICE VISIT (OUTPATIENT)
Dept: INTERNAL MEDICINE | Facility: CLINIC | Age: 82
End: 2017-09-18
Payer: MEDICARE

## 2017-09-18 VITALS
SYSTOLIC BLOOD PRESSURE: 154 MMHG | WEIGHT: 178 LBS | DIASTOLIC BLOOD PRESSURE: 89 MMHG | HEIGHT: 66 IN | BODY MASS INDEX: 28.61 KG/M2 | HEART RATE: 73 BPM | OXYGEN SATURATION: 96 % | TEMPERATURE: 98 F

## 2017-09-18 DIAGNOSIS — M19.011 PRIMARY OSTEOARTHRITIS OF BOTH SHOULDERS: Primary | ICD-10-CM

## 2017-09-18 DIAGNOSIS — M19.012 PRIMARY OSTEOARTHRITIS OF BOTH SHOULDERS: Primary | ICD-10-CM

## 2017-09-18 PROCEDURE — 99214 OFFICE O/P EST MOD 30 MIN: CPT | Mod: PBBFAC,PO | Performed by: FAMILY MEDICINE

## 2017-09-18 PROCEDURE — 1159F MED LIST DOCD IN RCRD: CPT | Mod: ,,, | Performed by: FAMILY MEDICINE

## 2017-09-18 PROCEDURE — 99999 PR PBB SHADOW E&M-EST. PATIENT-LVL IV: CPT | Mod: PBBFAC,,, | Performed by: FAMILY MEDICINE

## 2017-09-18 PROCEDURE — 99213 OFFICE O/P EST LOW 20 MIN: CPT | Mod: S$PBB,,, | Performed by: FAMILY MEDICINE

## 2017-09-18 PROCEDURE — 1125F AMNT PAIN NOTED PAIN PRSNT: CPT | Mod: ,,, | Performed by: FAMILY MEDICINE

## 2017-09-18 RX ORDER — ACETAMINOPHEN AND CODEINE PHOSPHATE 300; 30 MG/1; MG/1
1 TABLET ORAL 2 TIMES DAILY PRN
Qty: 30 TABLET | Refills: 0 | Status: SHIPPED | OUTPATIENT
Start: 2017-09-18 | End: 2017-11-08

## 2017-09-18 NOTE — PROGRESS NOTES
Subjective:       Patient ID: Francisca Pretty is a 88 y.o. female.    Chief Complaint: Follow-up    She continues with bilateral shoulder pain.  Worse in the morning.  Left shoulder cortisone injection did not help.      Review of Systems   Constitutional: Negative for chills and fever.   Respiratory: Negative for cough and shortness of breath.    Cardiovascular: Negative for chest pain and palpitations.   Musculoskeletal: Positive for arthralgias.   Skin: Negative for rash.       Objective:      Physical Exam   Constitutional: She appears well-developed and well-nourished. No distress.   Cardiovascular: Regular rhythm.    Musculoskeletal:   Pain with range of motion both shoulders.  No bone deformity.  No redness.       Office Visit on 06/26/2017   Component Date Value Ref Range Status    WBC 06/26/2017 6.92  3.90 - 12.70 K/uL Final    RBC 06/26/2017 4.68  4.00 - 5.40 M/uL Final    Hemoglobin 06/26/2017 14.0  12.0 - 16.0 g/dL Final    Hematocrit 06/26/2017 43.2  37.0 - 48.5 % Final    MCV 06/26/2017 92  82 - 98 fL Final    MCH 06/26/2017 29.9  27.0 - 31.0 pg Final    MCHC 06/26/2017 32.4  32.0 - 36.0 % Final    RDW 06/26/2017 14.9* 11.5 - 14.5 % Final    Platelets 06/26/2017 194  150 - 350 K/uL Final    MPV 06/26/2017 11.5  9.2 - 12.9 fL Final    Gran # 06/26/2017 4.9  1.8 - 7.7 K/uL Final    Lymph # 06/26/2017 1.5  1.0 - 4.8 K/uL Final    Mono # 06/26/2017 0.4  0.3 - 1.0 K/uL Final    Eos # 06/26/2017 0.1  0.0 - 0.5 K/uL Final    Baso # 06/26/2017 0.05  0.00 - 0.20 K/uL Final    Gran% 06/26/2017 70.4  38.0 - 73.0 % Final    Lymph% 06/26/2017 21.0  18.0 - 48.0 % Final    Mono% 06/26/2017 5.9  4.0 - 15.0 % Final    Eosinophil% 06/26/2017 1.9  0.0 - 8.0 % Final    Basophil% 06/26/2017 0.7  0.0 - 1.9 % Final    Differential Method 06/26/2017 Automated   Final    Specimen UA 06/26/2017 Urine, Clean Catch   Final    Color, UA 06/26/2017 Yellow  Yellow, Straw, Domitila Final    Appearance, UA  06/26/2017 Clear  Clear Final    pH, UA 06/26/2017 6.0  5.0 - 8.0 Final    Specific Gravity, UA 06/26/2017 1.015  1.005 - 1.030 Final    Protein, UA 06/26/2017 Negative  Negative Final    Glucose, UA 06/26/2017 Negative  Negative Final    Ketones, UA 06/26/2017 Negative  Negative Final    Bilirubin (UA) 06/26/2017 Negative  Negative Final    Occult Blood UA 06/26/2017 Negative  Negative Final    Nitrite, UA 06/26/2017 Negative  Negative Final    Urobilinogen, UA 06/26/2017 Negative  <2.0 EU/dL Final    Leukocytes, UA 06/26/2017 2+* Negative Final    Sodium 06/26/2017 140  136 - 145 mmol/L Final    Potassium 06/26/2017 4.4  3.5 - 5.1 mmol/L Final    Chloride 06/26/2017 109  95 - 110 mmol/L Final    CO2 06/26/2017 23  23 - 29 mmol/L Final    Glucose 06/26/2017 97  70 - 110 mg/dL Final    BUN, Bld 06/26/2017 15  8 - 23 mg/dL Final    Creatinine 06/26/2017 0.9  0.5 - 1.4 mg/dL Final    Calcium 06/26/2017 9.2  8.7 - 10.5 mg/dL Final    Total Protein 06/26/2017 7.0  6.0 - 8.4 g/dL Final    Albumin 06/26/2017 3.8  3.5 - 5.2 g/dL Final    Total Bilirubin 06/26/2017 0.4  0.1 - 1.0 mg/dL Final    Alkaline Phosphatase 06/26/2017 59  55 - 135 U/L Final    AST 06/26/2017 24  10 - 40 U/L Final    ALT 06/26/2017 14  10 - 44 U/L Final    Anion Gap 06/26/2017 8  8 - 16 mmol/L Final    eGFR if African American 06/26/2017 >60.0  >60 mL/min/1.73 m^2 Final    eGFR if non African American 06/26/2017 57.3* >60 mL/min/1.73 m^2 Final    Cholesterol 06/26/2017 177  120 - 199 mg/dL Final    Triglycerides 06/26/2017 74  30 - 150 mg/dL Final    HDL 06/26/2017 73  40 - 75 mg/dL Final    LDL Cholesterol 06/26/2017 89.2  63.0 - 159.0 mg/dL Final    HDL/Chol Ratio 06/26/2017 41.2  20.0 - 50.0 % Final    Total Cholesterol/HDL Ratio 06/26/2017 2.4  2.0 - 5.0 Final    Non-HDL Cholesterol 06/26/2017 104  mg/dL Final    TSH 06/26/2017 3.326  0.400 - 4.000 uIU/mL Final    Free T4 06/26/2017 0.83  0.71 - 1.51 ng/dL  Final    RBC, UA 06/26/2017 1  0 - 4 /hpf Final    WBC, UA 06/26/2017 4  0 - 5 /hpf Final    Bacteria, UA 06/26/2017 Rare  None-Occ /hpf Final    Squam Epithel,  06/26/2017 2  /hpf Final    Microscopic Comment 06/26/2017 SEE COMMENT   Final     Assessment:       1. Primary osteoarthritis of both shoulders        Plan:     Orthopedic referral external is requested.  Tylenol 3 for pain if needed.  Blood pressure is elevated today but usually normal.  Follow-up following orthopedic evaluation    Primary osteoarthritis of both shoulders  -     Ambulatory referral to Orthopedics  -     acetaminophen-codeine 300-30mg (TYLENOL #3) 300-30 mg Tab; Take 1 tablet by mouth 2 (two) times daily as needed.  Dispense: 30 tablet; Refill: 0

## 2017-09-19 ENCOUNTER — TELEPHONE (OUTPATIENT)
Dept: INTERNAL MEDICINE | Facility: CLINIC | Age: 82
End: 2017-09-19

## 2017-09-19 DIAGNOSIS — M19.011 PRIMARY OSTEOARTHRITIS OF BOTH SHOULDERS: Primary | ICD-10-CM

## 2017-09-19 DIAGNOSIS — M19.012 PRIMARY OSTEOARTHRITIS OF BOTH SHOULDERS: Primary | ICD-10-CM

## 2017-09-19 RX ORDER — METHYLPREDNISOLONE 4 MG/1
TABLET ORAL
Qty: 1 PACKAGE | Refills: 0 | Status: SHIPPED | OUTPATIENT
Start: 2017-09-19 | End: 2017-10-10

## 2017-09-19 NOTE — TELEPHONE ENCOUNTER
----- Message from Pam Flores sent at 9/19/2017  8:49 AM CDT -----  Contact: Patient  Patient called and stated she was seen yesterday and given a prescription that isn't helping. She states she is in a lot of pain.  She can be contacted at [phone].    Thanks,  Pam

## 2017-09-19 NOTE — TELEPHONE ENCOUNTER
Call pt to increase pain med and take 2 tabs 3 times a day and will send rx for medrol to pharm    did she get an appointment with her ortho yet?

## 2017-11-08 ENCOUNTER — OFFICE VISIT (OUTPATIENT)
Dept: INTERNAL MEDICINE | Facility: CLINIC | Age: 82
End: 2017-11-08
Payer: MEDICARE

## 2017-11-08 VITALS
DIASTOLIC BLOOD PRESSURE: 90 MMHG | HEIGHT: 66 IN | TEMPERATURE: 98 F | SYSTOLIC BLOOD PRESSURE: 142 MMHG | WEIGHT: 180.25 LBS | HEART RATE: 78 BPM | BODY MASS INDEX: 28.97 KG/M2 | OXYGEN SATURATION: 96 %

## 2017-11-08 DIAGNOSIS — I10 ESSENTIAL HYPERTENSION: Primary | ICD-10-CM

## 2017-11-08 DIAGNOSIS — R19.7 DIARRHEA, UNSPECIFIED TYPE: ICD-10-CM

## 2017-11-08 DIAGNOSIS — M19.011 PRIMARY OSTEOARTHRITIS OF RIGHT SHOULDER: ICD-10-CM

## 2017-11-08 PROCEDURE — 99213 OFFICE O/P EST LOW 20 MIN: CPT | Mod: PBBFAC,PO | Performed by: FAMILY MEDICINE

## 2017-11-08 PROCEDURE — 99999 PR PBB SHADOW E&M-EST. PATIENT-LVL III: CPT | Mod: PBBFAC,,, | Performed by: FAMILY MEDICINE

## 2017-11-08 PROCEDURE — 99213 OFFICE O/P EST LOW 20 MIN: CPT | Mod: S$PBB,,, | Performed by: FAMILY MEDICINE

## 2017-11-08 NOTE — PROGRESS NOTES
Subjective:       Patient ID: Francisca Pretty is a 88 y.o. female.    Chief Complaint: Follow-up    Follow-up  shoulder pain and hypertension.  Orthopedic evaluation was done for shoulder pain.  She received an injection that she reports did not help.  She then took Medrol Dosepak that helped for a while.  She has ongoing discomfort especially with range of motion.  She is doing water aerobics and taking Tylenol with some relief.  She denies headache chest pain palpitations or shortness of breath or edema.      Review of Systems   Constitutional: Negative for appetite change, fatigue and unexpected weight change.   Respiratory: Negative for cough, shortness of breath and wheezing.    Cardiovascular: Negative for chest pain, palpitations and leg swelling.   Gastrointestinal: Positive for diarrhea. Negative for abdominal pain.        She has a long history of diarrhea up  to 3 times a week.  Appetite is good with no weight loss.  She has not had a colonoscopy in quite a while.  She Would like to defer this.   Genitourinary: Negative for difficulty urinating.   Musculoskeletal: Positive for arthralgias.   Neurological: Negative for headaches.       Objective:      Physical Exam   Constitutional: She is oriented to person, place, and time. She appears well-developed and well-nourished. No distress.   Neck: Neck supple. No thyromegaly present.   Cardiovascular: Normal rate, regular rhythm and normal heart sounds.    No murmur heard.  Pulmonary/Chest: Effort normal and breath sounds normal. No respiratory distress. She has no wheezes.   Abdominal: Soft. Bowel sounds are normal. She exhibits no mass. There is no tenderness.   Musculoskeletal:   Full range of motion shoulder   Lymphadenopathy:     She has no cervical adenopathy.   Neurological: She is alert and oriented to person, place, and time.       Office Visit on 06/26/2017   Component Date Value Ref Range Status    WBC 06/26/2017 6.92  3.90 - 12.70 K/uL Final     RBC 06/26/2017 4.68  4.00 - 5.40 M/uL Final    Hemoglobin 06/26/2017 14.0  12.0 - 16.0 g/dL Final    Hematocrit 06/26/2017 43.2  37.0 - 48.5 % Final    MCV 06/26/2017 92  82 - 98 fL Final    MCH 06/26/2017 29.9  27.0 - 31.0 pg Final    MCHC 06/26/2017 32.4  32.0 - 36.0 % Final    RDW 06/26/2017 14.9* 11.5 - 14.5 % Final    Platelets 06/26/2017 194  150 - 350 K/uL Final    MPV 06/26/2017 11.5  9.2 - 12.9 fL Final    Gran # 06/26/2017 4.9  1.8 - 7.7 K/uL Final    Lymph # 06/26/2017 1.5  1.0 - 4.8 K/uL Final    Mono # 06/26/2017 0.4  0.3 - 1.0 K/uL Final    Eos # 06/26/2017 0.1  0.0 - 0.5 K/uL Final    Baso # 06/26/2017 0.05  0.00 - 0.20 K/uL Final    Gran% 06/26/2017 70.4  38.0 - 73.0 % Final    Lymph% 06/26/2017 21.0  18.0 - 48.0 % Final    Mono% 06/26/2017 5.9  4.0 - 15.0 % Final    Eosinophil% 06/26/2017 1.9  0.0 - 8.0 % Final    Basophil% 06/26/2017 0.7  0.0 - 1.9 % Final    Differential Method 06/26/2017 Automated   Final    Specimen UA 06/26/2017 Urine, Clean Catch   Final    Color, UA 06/26/2017 Yellow  Yellow, Straw, Domitila Final    Appearance, UA 06/26/2017 Clear  Clear Final    pH, UA 06/26/2017 6.0  5.0 - 8.0 Final    Specific Gravity, UA 06/26/2017 1.015  1.005 - 1.030 Final    Protein, UA 06/26/2017 Negative  Negative Final    Glucose, UA 06/26/2017 Negative  Negative Final    Ketones, UA 06/26/2017 Negative  Negative Final    Bilirubin (UA) 06/26/2017 Negative  Negative Final    Occult Blood UA 06/26/2017 Negative  Negative Final    Nitrite, UA 06/26/2017 Negative  Negative Final    Urobilinogen, UA 06/26/2017 Negative  <2.0 EU/dL Final    Leukocytes, UA 06/26/2017 2+* Negative Final    Sodium 06/26/2017 140  136 - 145 mmol/L Final    Potassium 06/26/2017 4.4  3.5 - 5.1 mmol/L Final    Chloride 06/26/2017 109  95 - 110 mmol/L Final    CO2 06/26/2017 23  23 - 29 mmol/L Final    Glucose 06/26/2017 97  70 - 110 mg/dL Final    BUN, Bld 06/26/2017 15  8 - 23 mg/dL  Final    Creatinine 06/26/2017 0.9  0.5 - 1.4 mg/dL Final    Calcium 06/26/2017 9.2  8.7 - 10.5 mg/dL Final    Total Protein 06/26/2017 7.0  6.0 - 8.4 g/dL Final    Albumin 06/26/2017 3.8  3.5 - 5.2 g/dL Final    Total Bilirubin 06/26/2017 0.4  0.1 - 1.0 mg/dL Final    Alkaline Phosphatase 06/26/2017 59  55 - 135 U/L Final    AST 06/26/2017 24  10 - 40 U/L Final    ALT 06/26/2017 14  10 - 44 U/L Final    Anion Gap 06/26/2017 8  8 - 16 mmol/L Final    eGFR if African American 06/26/2017 >60.0  >60 mL/min/1.73 m^2 Final    eGFR if non African American 06/26/2017 57.3* >60 mL/min/1.73 m^2 Final    Cholesterol 06/26/2017 177  120 - 199 mg/dL Final    Triglycerides 06/26/2017 74  30 - 150 mg/dL Final    HDL 06/26/2017 73  40 - 75 mg/dL Final    LDL Cholesterol 06/26/2017 89.2  63.0 - 159.0 mg/dL Final    HDL/Chol Ratio 06/26/2017 41.2  20.0 - 50.0 % Final    Total Cholesterol/HDL Ratio 06/26/2017 2.4  2.0 - 5.0 Final    Non-HDL Cholesterol 06/26/2017 104  mg/dL Final    TSH 06/26/2017 3.326  0.400 - 4.000 uIU/mL Final    Free T4 06/26/2017 0.83  0.71 - 1.51 ng/dL Final    RBC, UA 06/26/2017 1  0 - 4 /hpf Final    WBC, UA 06/26/2017 4  0 - 5 /hpf Final    Bacteria, UA 06/26/2017 Rare  None-Occ /hpf Final    Squam Epithel, UA 06/26/2017 2  /hpf Final    Microscopic Comment 06/26/2017 SEE COMMENT   Final     Assessment:       No diagnosis found.    Plan:   Blood pressure noted.  Monitor blood pressure home and follow-up in 3 months.  She declined flu immunization.  She was to defer colonoscopy and defer  stool occult blood.  Discussed taking FiberCon one to 2 a day.  Discussed diet.      There are no diagnoses linked to this encounter.

## 2018-02-13 ENCOUNTER — OFFICE VISIT (OUTPATIENT)
Dept: INTERNAL MEDICINE | Facility: CLINIC | Age: 83
End: 2018-02-13
Payer: MEDICARE

## 2018-02-13 VITALS
SYSTOLIC BLOOD PRESSURE: 142 MMHG | OXYGEN SATURATION: 95 % | HEART RATE: 82 BPM | WEIGHT: 180.13 LBS | BODY MASS INDEX: 28.95 KG/M2 | DIASTOLIC BLOOD PRESSURE: 96 MMHG | TEMPERATURE: 98 F | HEIGHT: 66 IN

## 2018-02-13 DIAGNOSIS — R03.0 ELEVATED BLOOD PRESSURE READING: ICD-10-CM

## 2018-02-13 DIAGNOSIS — I10 ESSENTIAL HYPERTENSION: Primary | ICD-10-CM

## 2018-02-13 DIAGNOSIS — M19.011 PRIMARY OSTEOARTHRITIS OF RIGHT SHOULDER: ICD-10-CM

## 2018-02-13 PROCEDURE — 99213 OFFICE O/P EST LOW 20 MIN: CPT | Mod: PBBFAC,PO | Performed by: FAMILY MEDICINE

## 2018-02-13 PROCEDURE — 1125F AMNT PAIN NOTED PAIN PRSNT: CPT | Mod: ,,, | Performed by: FAMILY MEDICINE

## 2018-02-13 PROCEDURE — 99999 PR PBB SHADOW E&M-EST. PATIENT-LVL III: CPT | Mod: PBBFAC,,, | Performed by: FAMILY MEDICINE

## 2018-02-13 PROCEDURE — 1159F MED LIST DOCD IN RCRD: CPT | Mod: ,,, | Performed by: FAMILY MEDICINE

## 2018-02-13 PROCEDURE — 99213 OFFICE O/P EST LOW 20 MIN: CPT | Mod: S$PBB,,, | Performed by: FAMILY MEDICINE

## 2018-02-13 RX ORDER — HYDROCHLOROTHIAZIDE 25 MG/1
25 TABLET ORAL DAILY
Qty: 30 TABLET | Refills: 11 | Status: SHIPPED | OUTPATIENT
Start: 2018-02-13 | End: 2018-12-06

## 2018-02-13 NOTE — PROGRESS NOTES
Subjective:       Patient ID: Francisca Pretty is a 88 y.o. female.    Chief Complaint: Follow-up    Follow-up hypertension and elevated blood pressure.  Currently she is taking lisinopril 20 mg a day.  She denies headache chest pain palpitations shortness of breath or edema.  She also continues with degenerative joint disease with pain involving shoulders wrist.  Previous cortisone shoulder injection did not give her the relief that she felt like she needed.      Review of Systems   Constitutional: Negative for appetite change, fatigue and unexpected weight change.   Respiratory: Negative for cough, shortness of breath and wheezing.    Cardiovascular: Negative for chest pain, palpitations and leg swelling.   Gastrointestinal: Negative for abdominal pain.   Genitourinary: Negative for difficulty urinating.   Musculoskeletal: Positive for arthralgias.   Neurological: Negative for headaches.       Objective:      Physical Exam   Constitutional: She is oriented to person, place, and time. She appears well-developed and well-nourished. No distress.   Neck: Neck supple. No thyromegaly present.   Cardiovascular: Normal rate, regular rhythm and normal heart sounds.    No murmur heard.  Pulmonary/Chest: Effort normal and breath sounds normal. No respiratory distress. She has no wheezes.   Lymphadenopathy:     She has no cervical adenopathy.   Neurological: She is alert and oriented to person, place, and time.       Office Visit on 06/26/2017   Component Date Value Ref Range Status    WBC 06/26/2017 6.92  3.90 - 12.70 K/uL Final    RBC 06/26/2017 4.68  4.00 - 5.40 M/uL Final    Hemoglobin 06/26/2017 14.0  12.0 - 16.0 g/dL Final    Hematocrit 06/26/2017 43.2  37.0 - 48.5 % Final    MCV 06/26/2017 92  82 - 98 fL Final    MCH 06/26/2017 29.9  27.0 - 31.0 pg Final    MCHC 06/26/2017 32.4  32.0 - 36.0 % Final    RDW 06/26/2017 14.9* 11.5 - 14.5 % Final    Platelets 06/26/2017 194  150 - 350 K/uL Final    MPV  06/26/2017 11.5  9.2 - 12.9 fL Final    Gran # (ANC) 06/26/2017 4.9  1.8 - 7.7 K/uL Final    Lymph # 06/26/2017 1.5  1.0 - 4.8 K/uL Final    Mono # 06/26/2017 0.4  0.3 - 1.0 K/uL Final    Eos # 06/26/2017 0.1  0.0 - 0.5 K/uL Final    Baso # 06/26/2017 0.05  0.00 - 0.20 K/uL Final    Gran% 06/26/2017 70.4  38.0 - 73.0 % Final    Lymph% 06/26/2017 21.0  18.0 - 48.0 % Final    Mono% 06/26/2017 5.9  4.0 - 15.0 % Final    Eosinophil% 06/26/2017 1.9  0.0 - 8.0 % Final    Basophil% 06/26/2017 0.7  0.0 - 1.9 % Final    Differential Method 06/26/2017 Automated   Final    Specimen UA 06/26/2017 Urine, Clean Catch   Final    Color, UA 06/26/2017 Yellow  Yellow, Straw, Domitila Final    Appearance, UA 06/26/2017 Clear  Clear Final    pH, UA 06/26/2017 6.0  5.0 - 8.0 Final    Specific Gravity, UA 06/26/2017 1.015  1.005 - 1.030 Final    Protein, UA 06/26/2017 Negative  Negative Final    Glucose, UA 06/26/2017 Negative  Negative Final    Ketones, UA 06/26/2017 Negative  Negative Final    Bilirubin (UA) 06/26/2017 Negative  Negative Final    Occult Blood UA 06/26/2017 Negative  Negative Final    Nitrite, UA 06/26/2017 Negative  Negative Final    Urobilinogen, UA 06/26/2017 Negative  <2.0 EU/dL Final    Leukocytes, UA 06/26/2017 2+* Negative Final    Sodium 06/26/2017 140  136 - 145 mmol/L Final    Potassium 06/26/2017 4.4  3.5 - 5.1 mmol/L Final    Chloride 06/26/2017 109  95 - 110 mmol/L Final    CO2 06/26/2017 23  23 - 29 mmol/L Final    Glucose 06/26/2017 97  70 - 110 mg/dL Final    BUN, Bld 06/26/2017 15  8 - 23 mg/dL Final    Creatinine 06/26/2017 0.9  0.5 - 1.4 mg/dL Final    Calcium 06/26/2017 9.2  8.7 - 10.5 mg/dL Final    Total Protein 06/26/2017 7.0  6.0 - 8.4 g/dL Final    Albumin 06/26/2017 3.8  3.5 - 5.2 g/dL Final    Total Bilirubin 06/26/2017 0.4  0.1 - 1.0 mg/dL Final    Alkaline Phosphatase 06/26/2017 59  55 - 135 U/L Final    AST 06/26/2017 24  10 - 40 U/L Final    ALT  06/26/2017 14  10 - 44 U/L Final    Anion Gap 06/26/2017 8  8 - 16 mmol/L Final    eGFR if African American 06/26/2017 >60.0  >60 mL/min/1.73 m^2 Final    eGFR if non African American 06/26/2017 57.3* >60 mL/min/1.73 m^2 Final    Cholesterol 06/26/2017 177  120 - 199 mg/dL Final    Triglycerides 06/26/2017 74  30 - 150 mg/dL Final    HDL 06/26/2017 73  40 - 75 mg/dL Final    LDL Cholesterol 06/26/2017 89.2  63.0 - 159.0 mg/dL Final    HDL/Chol Ratio 06/26/2017 41.2  20.0 - 50.0 % Final    Total Cholesterol/HDL Ratio 06/26/2017 2.4  2.0 - 5.0 Final    Non-HDL Cholesterol 06/26/2017 104  mg/dL Final    TSH 06/26/2017 3.326  0.400 - 4.000 uIU/mL Final    Free T4 06/26/2017 0.83  0.71 - 1.51 ng/dL Final    RBC, UA 06/26/2017 1  0 - 4 /hpf Final    WBC, UA 06/26/2017 4  0 - 5 /hpf Final    Bacteria, UA 06/26/2017 Rare  None-Occ /hpf Final    Squam Epithel, UA 06/26/2017 2  /hpf Final    Microscopic Comment 06/26/2017 SEE COMMENT   Final     Assessment:       1. Essential hypertension        Plan:     Head HCTZ 25 mg a day.  Try ibuprofen 400 mg twice a day for 2 weeks regard joint pain.  Take with food.  She denies any cardiovascular history or gastric history.  Monitor blood pressure daily at home and return in one month    Essential hypertension  -     hydroCHLOROthiazide (HYDRODIURIL) 25 MG tablet; Take 1 tablet (25 mg total) by mouth once daily.  Dispense: 30 tablet; Refill: 11

## 2018-02-28 ENCOUNTER — PATIENT OUTREACH (OUTPATIENT)
Dept: ADMINISTRATIVE | Facility: HOSPITAL | Age: 83
End: 2018-02-28

## 2018-03-13 ENCOUNTER — OFFICE VISIT (OUTPATIENT)
Dept: INTERNAL MEDICINE | Facility: CLINIC | Age: 83
End: 2018-03-13
Payer: MEDICARE

## 2018-03-13 VITALS
TEMPERATURE: 98 F | SYSTOLIC BLOOD PRESSURE: 122 MMHG | HEART RATE: 71 BPM | DIASTOLIC BLOOD PRESSURE: 88 MMHG | WEIGHT: 178.13 LBS | OXYGEN SATURATION: 98 % | HEIGHT: 66 IN | BODY MASS INDEX: 28.63 KG/M2

## 2018-03-13 DIAGNOSIS — G47.62 NOCTURNAL LEG CRAMPS: ICD-10-CM

## 2018-03-13 DIAGNOSIS — I10 ESSENTIAL HYPERTENSION: Primary | ICD-10-CM

## 2018-03-13 LAB
ALBUMIN SERPL BCP-MCNC: 3.9 G/DL
ALP SERPL-CCNC: 46 U/L
ALT SERPL W/O P-5'-P-CCNC: 10 U/L
ANION GAP SERPL CALC-SCNC: 6 MMOL/L
AST SERPL-CCNC: 22 U/L
BASOPHILS # BLD AUTO: 0.06 K/UL
BASOPHILS NFR BLD: 1 %
BILIRUB SERPL-MCNC: 0.5 MG/DL
BUN SERPL-MCNC: 17 MG/DL
CALCIUM SERPL-MCNC: 9.8 MG/DL
CHLORIDE SERPL-SCNC: 103 MMOL/L
CHOLEST SERPL-MCNC: 178 MG/DL
CHOLEST/HDLC SERPL: 2.4 {RATIO}
CO2 SERPL-SCNC: 30 MMOL/L
CREAT SERPL-MCNC: 1.1 MG/DL
DIFFERENTIAL METHOD: NORMAL
EOSINOPHIL # BLD AUTO: 0.1 K/UL
EOSINOPHIL NFR BLD: 1.9 %
ERYTHROCYTE [DISTWIDTH] IN BLOOD BY AUTOMATED COUNT: 13.2 %
EST. GFR  (AFRICAN AMERICAN): 51.8 ML/MIN/1.73 M^2
EST. GFR  (NON AFRICAN AMERICAN): 44.9 ML/MIN/1.73 M^2
GLUCOSE SERPL-MCNC: 101 MG/DL
HCT VFR BLD AUTO: 39.5 %
HDLC SERPL-MCNC: 75 MG/DL
HDLC SERPL: 42.1 %
HGB BLD-MCNC: 12.9 G/DL
IMM GRANULOCYTES # BLD AUTO: 0.01 K/UL
IMM GRANULOCYTES NFR BLD AUTO: 0.2 %
LDLC SERPL CALC-MCNC: 86.6 MG/DL
LYMPHOCYTES # BLD AUTO: 1.4 K/UL
LYMPHOCYTES NFR BLD: 22.1 %
MCH RBC QN AUTO: 29.9 PG
MCHC RBC AUTO-ENTMCNC: 32.7 G/DL
MCV RBC AUTO: 92 FL
MONOCYTES # BLD AUTO: 0.5 K/UL
MONOCYTES NFR BLD: 7.6 %
NEUTROPHILS # BLD AUTO: 4.2 K/UL
NEUTROPHILS NFR BLD: 67.2 %
NONHDLC SERPL-MCNC: 103 MG/DL
NRBC BLD-RTO: 0 /100 WBC
PLATELET # BLD AUTO: 207 K/UL
PMV BLD AUTO: 10.9 FL
POTASSIUM SERPL-SCNC: 4.3 MMOL/L
PROT SERPL-MCNC: 6.9 G/DL
RBC # BLD AUTO: 4.31 M/UL
SODIUM SERPL-SCNC: 139 MMOL/L
T4 FREE SERPL-MCNC: 0.82 NG/DL
TRIGL SERPL-MCNC: 82 MG/DL
TSH SERPL DL<=0.005 MIU/L-ACNC: 4.25 UIU/ML
WBC # BLD AUTO: 6.21 K/UL

## 2018-03-13 PROCEDURE — 85025 COMPLETE CBC W/AUTO DIFF WBC: CPT

## 2018-03-13 PROCEDURE — 99999 PR PBB SHADOW E&M-EST. PATIENT-LVL III: CPT | Mod: PBBFAC,,, | Performed by: FAMILY MEDICINE

## 2018-03-13 PROCEDURE — 99213 OFFICE O/P EST LOW 20 MIN: CPT | Mod: S$PBB,,, | Performed by: FAMILY MEDICINE

## 2018-03-13 PROCEDURE — 99213 OFFICE O/P EST LOW 20 MIN: CPT | Mod: PBBFAC,PO | Performed by: FAMILY MEDICINE

## 2018-03-13 PROCEDURE — 80061 LIPID PANEL: CPT

## 2018-03-13 PROCEDURE — 84443 ASSAY THYROID STIM HORMONE: CPT

## 2018-03-13 PROCEDURE — 84439 ASSAY OF FREE THYROXINE: CPT

## 2018-03-13 PROCEDURE — 80053 COMPREHEN METABOLIC PANEL: CPT

## 2018-03-13 NOTE — PROGRESS NOTES
Subjective:       Patient ID: Francisca Pretty is a 88 y.o. female.    Chief Complaint: Follow-up    Follow-up hypertension.  She has occasional nocturnal leg cramp.  Arthritic pain is improved.  She reports that for the last year she's felt somewhat tired.  She is able to do water aerobics for about 40 minutes most days with no fatigue.  She denies any chest pain and palpitations shortness of breath.  Appetite is good.  Weight remains stable.      Review of Systems   Constitutional: Positive for fatigue. Negative for activity change, appetite change and unexpected weight change.   Respiratory: Negative for cough and shortness of breath.    Cardiovascular: Negative for chest pain, palpitations and leg swelling.   Neurological: Negative for speech difficulty, light-headedness and headaches.       Objective:      Physical Exam   Constitutional: She is oriented to person, place, and time. She appears well-developed and well-nourished. No distress.   Neck: Neck supple. No thyromegaly present.   Cardiovascular: Normal rate, regular rhythm and normal heart sounds.    No murmur heard.  Pulmonary/Chest: Effort normal and breath sounds normal. No respiratory distress. She has no wheezes.   Lymphadenopathy:     She has no cervical adenopathy.   Neurological: She is alert and oriented to person, place, and time.       Office Visit on 06/26/2017   Component Date Value Ref Range Status    WBC 06/26/2017 6.92  3.90 - 12.70 K/uL Final    RBC 06/26/2017 4.68  4.00 - 5.40 M/uL Final    Hemoglobin 06/26/2017 14.0  12.0 - 16.0 g/dL Final    Hematocrit 06/26/2017 43.2  37.0 - 48.5 % Final    MCV 06/26/2017 92  82 - 98 fL Final    MCH 06/26/2017 29.9  27.0 - 31.0 pg Final    MCHC 06/26/2017 32.4  32.0 - 36.0 % Final    RDW 06/26/2017 14.9* 11.5 - 14.5 % Final    Platelets 06/26/2017 194  150 - 350 K/uL Final    MPV 06/26/2017 11.5  9.2 - 12.9 fL Final    Gran # (ANC) 06/26/2017 4.9  1.8 - 7.7 K/uL Final    Lymph #  06/26/2017 1.5  1.0 - 4.8 K/uL Final    Mono # 06/26/2017 0.4  0.3 - 1.0 K/uL Final    Eos # 06/26/2017 0.1  0.0 - 0.5 K/uL Final    Baso # 06/26/2017 0.05  0.00 - 0.20 K/uL Final    Gran% 06/26/2017 70.4  38.0 - 73.0 % Final    Lymph% 06/26/2017 21.0  18.0 - 48.0 % Final    Mono% 06/26/2017 5.9  4.0 - 15.0 % Final    Eosinophil% 06/26/2017 1.9  0.0 - 8.0 % Final    Basophil% 06/26/2017 0.7  0.0 - 1.9 % Final    Differential Method 06/26/2017 Automated   Final    Specimen UA 06/26/2017 Urine, Clean Catch   Final    Color, UA 06/26/2017 Yellow  Yellow, Straw, Domitila Final    Appearance, UA 06/26/2017 Clear  Clear Final    pH, UA 06/26/2017 6.0  5.0 - 8.0 Final    Specific Gravity, UA 06/26/2017 1.015  1.005 - 1.030 Final    Protein, UA 06/26/2017 Negative  Negative Final    Glucose, UA 06/26/2017 Negative  Negative Final    Ketones, UA 06/26/2017 Negative  Negative Final    Bilirubin (UA) 06/26/2017 Negative  Negative Final    Occult Blood UA 06/26/2017 Negative  Negative Final    Nitrite, UA 06/26/2017 Negative  Negative Final    Urobilinogen, UA 06/26/2017 Negative  <2.0 EU/dL Final    Leukocytes, UA 06/26/2017 2+* Negative Final    Sodium 06/26/2017 140  136 - 145 mmol/L Final    Potassium 06/26/2017 4.4  3.5 - 5.1 mmol/L Final    Chloride 06/26/2017 109  95 - 110 mmol/L Final    CO2 06/26/2017 23  23 - 29 mmol/L Final    Glucose 06/26/2017 97  70 - 110 mg/dL Final    BUN, Bld 06/26/2017 15  8 - 23 mg/dL Final    Creatinine 06/26/2017 0.9  0.5 - 1.4 mg/dL Final    Calcium 06/26/2017 9.2  8.7 - 10.5 mg/dL Final    Total Protein 06/26/2017 7.0  6.0 - 8.4 g/dL Final    Albumin 06/26/2017 3.8  3.5 - 5.2 g/dL Final    Total Bilirubin 06/26/2017 0.4  0.1 - 1.0 mg/dL Final    Alkaline Phosphatase 06/26/2017 59  55 - 135 U/L Final    AST 06/26/2017 24  10 - 40 U/L Final    ALT 06/26/2017 14  10 - 44 U/L Final    Anion Gap 06/26/2017 8  8 - 16 mmol/L Final    eGFR if   06/26/2017 >60.0  >60 mL/min/1.73 m^2 Final    eGFR if non African American 06/26/2017 57.3* >60 mL/min/1.73 m^2 Final    Cholesterol 06/26/2017 177  120 - 199 mg/dL Final    Triglycerides 06/26/2017 74  30 - 150 mg/dL Final    HDL 06/26/2017 73  40 - 75 mg/dL Final    LDL Cholesterol 06/26/2017 89.2  63.0 - 159.0 mg/dL Final    HDL/Chol Ratio 06/26/2017 41.2  20.0 - 50.0 % Final    Total Cholesterol/HDL Ratio 06/26/2017 2.4  2.0 - 5.0 Final    Non-HDL Cholesterol 06/26/2017 104  mg/dL Final    TSH 06/26/2017 3.326  0.400 - 4.000 uIU/mL Final    Free T4 06/26/2017 0.83  0.71 - 1.51 ng/dL Final    RBC, UA 06/26/2017 1  0 - 4 /hpf Final    WBC, UA 06/26/2017 4  0 - 5 /hpf Final    Bacteria, UA 06/26/2017 Rare  None-Occ /hpf Final    Squam Epithel, UA 06/26/2017 2  /hpf Final    Microscopic Comment 06/26/2017 SEE COMMENT   Final     Assessment:       1. Essential hypertension        Plan:     Blood pressures controlled continue current medications.  Routine lab was ordered.  Follow-up in 4 months. Try tonic water HS for leg cramps    Essential hypertension  -     CBC auto differential  -     Comprehensive metabolic panel  -     Lipid panel  -     TSH

## 2018-06-28 ENCOUNTER — PATIENT OUTREACH (OUTPATIENT)
Dept: ADMINISTRATIVE | Facility: HOSPITAL | Age: 83
End: 2018-06-28

## 2018-07-12 ENCOUNTER — OFFICE VISIT (OUTPATIENT)
Dept: INTERNAL MEDICINE | Facility: CLINIC | Age: 83
End: 2018-07-12
Payer: MEDICARE

## 2018-07-12 VITALS
BODY MASS INDEX: 28.22 KG/M2 | OXYGEN SATURATION: 97 % | WEIGHT: 174.81 LBS | TEMPERATURE: 98 F | SYSTOLIC BLOOD PRESSURE: 100 MMHG | HEART RATE: 70 BPM | DIASTOLIC BLOOD PRESSURE: 70 MMHG

## 2018-07-12 DIAGNOSIS — I10 ESSENTIAL HYPERTENSION: Primary | ICD-10-CM

## 2018-07-12 DIAGNOSIS — R53.83 FATIGUE, UNSPECIFIED TYPE: ICD-10-CM

## 2018-07-12 DIAGNOSIS — R05.9 COUGH: ICD-10-CM

## 2018-07-12 DIAGNOSIS — E03.8 SUBCLINICAL HYPOTHYROIDISM: ICD-10-CM

## 2018-07-12 DIAGNOSIS — N18.30 CHRONIC KIDNEY DISEASE (CKD), STAGE III (MODERATE): ICD-10-CM

## 2018-07-12 LAB
ALBUMIN SERPL BCP-MCNC: 3.8 G/DL
ANION GAP SERPL CALC-SCNC: 9 MMOL/L
BUN SERPL-MCNC: 18 MG/DL
CALCIUM SERPL-MCNC: 9.9 MG/DL
CHLORIDE SERPL-SCNC: 105 MMOL/L
CO2 SERPL-SCNC: 26 MMOL/L
CREAT SERPL-MCNC: 1.1 MG/DL
EST. GFR  (AFRICAN AMERICAN): 51.4 ML/MIN/1.73 M^2
EST. GFR  (NON AFRICAN AMERICAN): 44.6 ML/MIN/1.73 M^2
GLUCOSE SERPL-MCNC: 98 MG/DL
PHOSPHATE SERPL-MCNC: 2.9 MG/DL
POTASSIUM SERPL-SCNC: 5.1 MMOL/L
SODIUM SERPL-SCNC: 140 MMOL/L
T4 FREE SERPL-MCNC: 0.85 NG/DL
TSH SERPL DL<=0.005 MIU/L-ACNC: 3.58 UIU/ML

## 2018-07-12 PROCEDURE — 99214 OFFICE O/P EST MOD 30 MIN: CPT | Mod: S$PBB,,, | Performed by: FAMILY MEDICINE

## 2018-07-12 PROCEDURE — 99213 OFFICE O/P EST LOW 20 MIN: CPT | Mod: PBBFAC,PO | Performed by: FAMILY MEDICINE

## 2018-07-12 PROCEDURE — 99999 PR PBB SHADOW E&M-EST. PATIENT-LVL III: CPT | Mod: PBBFAC,,, | Performed by: FAMILY MEDICINE

## 2018-07-12 PROCEDURE — 80069 RENAL FUNCTION PANEL: CPT

## 2018-07-12 PROCEDURE — 84443 ASSAY THYROID STIM HORMONE: CPT

## 2018-07-12 PROCEDURE — 84439 ASSAY OF FREE THYROXINE: CPT

## 2018-07-12 RX ORDER — LISINOPRIL 10 MG/1
10 TABLET ORAL DAILY
Qty: 30 TABLET | Refills: 11 | Status: SHIPPED | OUTPATIENT
Start: 2018-07-12 | End: 2018-07-12

## 2018-07-12 RX ORDER — LISINOPRIL 10 MG/1
10 TABLET ORAL DAILY
Qty: 90 TABLET | Refills: 3 | Status: SHIPPED | OUTPATIENT
Start: 2018-07-12 | End: 2019-08-29 | Stop reason: SDUPTHER

## 2018-07-12 NOTE — PROGRESS NOTES
Subjective:       Patient ID: Francisca Pretty is a 89 y.o. female.    Chief Complaint: Follow-up (patient has concerns that medication giving her a dry mouth and dry cough)    Follow-up hypertension abnormal TSH chronic kidney disease stage 3.  She reports ongoing dry cough and is concerned about lisinopril.  Previous fatigue has improved.  she is avoiding NSAIDs.  She denies headache chest pain palpitations shortness of breath or edema      Review of Systems   Constitutional: Positive for fatigue. Negative for activity change, appetite change, fever and unexpected weight change.   HENT: Negative for congestion.    Respiratory: Positive for cough. Negative for chest tightness, shortness of breath and wheezing.    Cardiovascular: Negative for chest pain, palpitations and leg swelling.        Denies lightheadedness or syncope denies  postural dizziness   Gastrointestinal: Negative for abdominal pain.   Genitourinary: Negative for difficulty urinating.   Neurological: Negative for dizziness, syncope, facial asymmetry, speech difficulty, weakness, light-headedness, numbness and headaches.       Objective:      Physical Exam   Constitutional: She is oriented to person, place, and time. She appears well-developed and well-nourished. No distress.   Neck: Neck supple. No thyromegaly present.   Cardiovascular: Normal rate, regular rhythm and normal heart sounds.    No murmur heard.  Pulmonary/Chest: Effort normal and breath sounds normal. No respiratory distress. She has no wheezes.   Abdominal: Soft. Bowel sounds are normal. She exhibits no mass. There is no tenderness.   Lymphadenopathy:     She has no cervical adenopathy.   Neurological: She is alert and oriented to person, place, and time. She exhibits normal muscle tone. Coordination normal.   Skin: She is not diaphoretic.       Office Visit on 03/13/2018   Component Date Value Ref Range Status    WBC 03/13/2018 6.21  3.90 - 12.70 K/uL Final    RBC 03/13/2018  4.31  4.00 - 5.40 M/uL Final    Hemoglobin 03/13/2018 12.9  12.0 - 16.0 g/dL Final    Hematocrit 03/13/2018 39.5  37.0 - 48.5 % Final    MCV 03/13/2018 92  82 - 98 fL Final    MCH 03/13/2018 29.9  27.0 - 31.0 pg Final    MCHC 03/13/2018 32.7  32.0 - 36.0 g/dL Final    RDW 03/13/2018 13.2  11.5 - 14.5 % Final    Platelets 03/13/2018 207  150 - 350 K/uL Final    MPV 03/13/2018 10.9  9.2 - 12.9 fL Final    Immature Granulocytes 03/13/2018 0.2  0.0 - 0.5 % Final    Gran # (ANC) 03/13/2018 4.2  1.8 - 7.7 K/uL Final    Immature Grans (Abs) 03/13/2018 0.01  0.00 - 0.04 K/uL Final    Lymph # 03/13/2018 1.4  1.0 - 4.8 K/uL Final    Mono # 03/13/2018 0.5  0.3 - 1.0 K/uL Final    Eos # 03/13/2018 0.1  0.0 - 0.5 K/uL Final    Baso # 03/13/2018 0.06  0.00 - 0.20 K/uL Final    nRBC 03/13/2018 0  0 /100 WBC Final    Gran% 03/13/2018 67.2  38.0 - 73.0 % Final    Lymph% 03/13/2018 22.1  18.0 - 48.0 % Final    Mono% 03/13/2018 7.6  4.0 - 15.0 % Final    Eosinophil% 03/13/2018 1.9  0.0 - 8.0 % Final    Basophil% 03/13/2018 1.0  0.0 - 1.9 % Final    Differential Method 03/13/2018 Automated   Final    Sodium 03/13/2018 139  136 - 145 mmol/L Final    Potassium 03/13/2018 4.3  3.5 - 5.1 mmol/L Final    Chloride 03/13/2018 103  95 - 110 mmol/L Final    CO2 03/13/2018 30* 23 - 29 mmol/L Final    Glucose 03/13/2018 101  70 - 110 mg/dL Final    BUN, Bld 03/13/2018 17  8 - 23 mg/dL Final    Creatinine 03/13/2018 1.1  0.5 - 1.4 mg/dL Final    Calcium 03/13/2018 9.8  8.7 - 10.5 mg/dL Final    Total Protein 03/13/2018 6.9  6.0 - 8.4 g/dL Final    Albumin 03/13/2018 3.9  3.5 - 5.2 g/dL Final    Total Bilirubin 03/13/2018 0.5  0.1 - 1.0 mg/dL Final    Alkaline Phosphatase 03/13/2018 46* 55 - 135 U/L Final    AST 03/13/2018 22  10 - 40 U/L Final    ALT 03/13/2018 10  10 - 44 U/L Final    Anion Gap 03/13/2018 6* 8 - 16 mmol/L Final    eGFR if  03/13/2018 51.8* >60 mL/min/1.73 m^2 Final    eGFR if  non  03/13/2018 44.9* >60 mL/min/1.73 m^2 Final    Cholesterol 03/13/2018 178  120 - 199 mg/dL Final    Triglycerides 03/13/2018 82  30 - 150 mg/dL Final    HDL 03/13/2018 75  40 - 75 mg/dL Final    LDL Cholesterol 03/13/2018 86.6  63.0 - 159.0 mg/dL Final    HDL/Chol Ratio 03/13/2018 42.1  20.0 - 50.0 % Final    Total Cholesterol/HDL Ratio 03/13/2018 2.4  2.0 - 5.0 Final    Non-HDL Cholesterol 03/13/2018 103  mg/dL Final    TSH 03/13/2018 4.249* 0.400 - 4.000 uIU/mL Final    Free T4 03/13/2018 0.82  0.71 - 1.51 ng/dL Final     Assessment:       1. Essential hypertension    2. Subclinical hypothyroidism    3. Chronic kidney disease (CKD), stage III (moderate)        Plan:     Blood pressure 100/70 left arm sitting.  Decrease lisinopril 20 mg started 10 mg a day.  Will monitor dry cough.  Consider chest x-ray on return.  Lab was ordered.  Recheck chronic kidney disease and thyroid functions.  Follow up in 1 month.    Essential hypertension    Subclinical hypothyroidism  -     TSH  -     T4, free    Chronic kidney disease (CKD), stage III (moderate)  -     Renal function panel; Future; Expected date: 07/12/2018    Other orders  -     Discontinue: INV lisinopril 10 MG tablet; Take 1 tablet (10 mg total) by mouth once daily.  Dispense: 30 tablet; Refill: 11  -     lisinopril 10 MG tablet; Take 1 tablet (10 mg total) by mouth once daily.  Dispense: 90 tablet; Refill: 3

## 2018-07-26 ENCOUNTER — PATIENT OUTREACH (OUTPATIENT)
Dept: ADMINISTRATIVE | Facility: HOSPITAL | Age: 83
End: 2018-07-26

## 2018-08-09 ENCOUNTER — OFFICE VISIT (OUTPATIENT)
Dept: INTERNAL MEDICINE | Facility: CLINIC | Age: 83
End: 2018-08-09
Payer: MEDICARE

## 2018-08-09 VITALS
SYSTOLIC BLOOD PRESSURE: 125 MMHG | OXYGEN SATURATION: 96 % | BODY MASS INDEX: 28.27 KG/M2 | TEMPERATURE: 98 F | DIASTOLIC BLOOD PRESSURE: 79 MMHG | HEART RATE: 75 BPM | WEIGHT: 175.13 LBS

## 2018-08-09 DIAGNOSIS — N18.30 CHRONIC KIDNEY DISEASE (CKD), STAGE III (MODERATE): ICD-10-CM

## 2018-08-09 DIAGNOSIS — M19.011 PRIMARY OSTEOARTHRITIS OF RIGHT SHOULDER: ICD-10-CM

## 2018-08-09 DIAGNOSIS — R05.9 COUGH: ICD-10-CM

## 2018-08-09 DIAGNOSIS — I77.1 TORTUOUS AORTA: ICD-10-CM

## 2018-08-09 DIAGNOSIS — E03.8 SUBCLINICAL HYPOTHYROIDISM: ICD-10-CM

## 2018-08-09 DIAGNOSIS — I10 ESSENTIAL HYPERTENSION: Primary | ICD-10-CM

## 2018-08-09 PROCEDURE — 99214 OFFICE O/P EST MOD 30 MIN: CPT | Mod: S$PBB,,, | Performed by: FAMILY MEDICINE

## 2018-08-09 PROCEDURE — 99999 PR PBB SHADOW E&M-EST. PATIENT-LVL III: CPT | Mod: PBBFAC,,, | Performed by: FAMILY MEDICINE

## 2018-08-09 PROCEDURE — 99213 OFFICE O/P EST LOW 20 MIN: CPT | Mod: PBBFAC,PO | Performed by: FAMILY MEDICINE

## 2018-08-09 RX ORDER — ACETAMINOPHEN AND CODEINE PHOSPHATE 300; 30 MG/1; MG/1
1 TABLET ORAL DAILY PRN
Qty: 30 TABLET | Refills: 0 | Status: SHIPPED | OUTPATIENT
Start: 2018-08-09 | End: 2018-08-19

## 2018-08-09 NOTE — PROGRESS NOTES
Subjective:       Patient ID: Francisca Pretty is a 89 y.o. female.    Chief Complaint: f/u on BP    Follow-up hypertension cough hypothyroidism chronic kidney disease. Previous chest x-ray with tortuous aorta.  She denies headache chest pain palpitations shortness of breath or edema. She has occasional mild cough with eating.  She denies reflux or dysphagia.  Recent TSH free T4 are within normal range.  Chronic kidney disease is stable and she is avoiding NSAIDs ibuprofen.  She uses Tylenol for pain if needed.  She EKG uses Tylenol with codeine for joint pain with increased activity associated with degenerative joint disease.      Review of Systems   Constitutional: Negative for activity change, appetite change, diaphoresis, fatigue and unexpected weight change.   Respiratory: Positive for cough. Negative for chest tightness, shortness of breath and wheezing.    Cardiovascular: Negative for chest pain, palpitations and leg swelling.        Denies lightheadedness syncope   Gastrointestinal: Negative for abdominal pain.   Genitourinary: Negative for difficulty urinating.   Musculoskeletal: Positive for arthralgias.   Neurological: Negative for syncope, facial asymmetry, speech difficulty, weakness, light-headedness and headaches.       Objective:      Physical Exam   Constitutional: She appears well-developed and well-nourished. No distress.   Cardiovascular: Normal rate and regular rhythm.  Exam reveals no gallop.    No murmur heard.  Pulmonary/Chest: Effort normal and breath sounds normal. She has no wheezes. She has no rales.   Abdominal: Soft. Bowel sounds are normal. She exhibits no distension and no mass. There is no tenderness.   Skin: She is not diaphoretic.       Office Visit on 07/12/2018   Component Date Value Ref Range Status    TSH 07/12/2018 3.582  0.400 - 4.000 uIU/mL Final    Free T4 07/12/2018 0.85  0.71 - 1.51 ng/dL Final    Glucose 07/12/2018 98  70 - 110 mg/dL Final    Sodium 07/12/2018  140  136 - 145 mmol/L Final    Potassium 07/12/2018 5.1  3.5 - 5.1 mmol/L Final    Chloride 07/12/2018 105  95 - 110 mmol/L Final    CO2 07/12/2018 26  23 - 29 mmol/L Final    BUN, Bld 07/12/2018 18  8 - 23 mg/dL Final    Calcium 07/12/2018 9.9  8.7 - 10.5 mg/dL Final    Creatinine 07/12/2018 1.1  0.5 - 1.4 mg/dL Final    Albumin 07/12/2018 3.8  3.5 - 5.2 g/dL Final    Phosphorus 07/12/2018 2.9  2.7 - 4.5 mg/dL Final    eGFR if  07/12/2018 51.4* >60 mL/min/1.73 m^2 Final    eGFR if non African American 07/12/2018 44.6* >60 mL/min/1.73 m^2 Final    Anion Gap 07/12/2018 9  8 - 16 mmol/L Final     Assessment:       1. Essential hypertension    2. Cough    3. Chronic kidney disease (CKD), stage III (moderate)    4. Tortuous aorta        Plan:   Blood pressure is 125/79.  Continue HCTZ lisinopril daily.  Refill Tylenol codeine for occasional use for degenerative joint disease. Continue voiding ibuprofen naproxen due to chronic kidney disease. Follow-up in 4 months.      Essential hypertension    Cough    Chronic kidney disease (CKD), stage III (moderate)    Tortuous aorta    Other orders  -     acetaminophen-codeine 300-30mg (TYLENOL #3) 300-30 mg Tab; Take 1 tablet by mouth daily as needed.  Dispense: 30 tablet; Refill: 0

## 2018-11-21 ENCOUNTER — TELEPHONE (OUTPATIENT)
Dept: INTERNAL MEDICINE | Facility: CLINIC | Age: 83
End: 2018-11-21

## 2018-11-21 NOTE — TELEPHONE ENCOUNTER
----- Message from Maria Elena Wall sent at 11/21/2018 10:56 AM CST -----  Contact: Pt  Pt called and stated she was returning a call to the nurse. She can be reached at [phone].    Thanks,  TF

## 2018-11-21 NOTE — TELEPHONE ENCOUNTER
Pt states that someone called her in regards to home health or home plan, could not remember. Wanted know more info. Informed her I will try to find out who had possibly called her and let her know. Verbalized understanding.

## 2018-12-06 ENCOUNTER — APPOINTMENT (OUTPATIENT)
Dept: RADIOLOGY | Facility: HOSPITAL | Age: 83
End: 2018-12-06
Attending: FAMILY MEDICINE
Payer: MEDICARE

## 2018-12-06 ENCOUNTER — OFFICE VISIT (OUTPATIENT)
Dept: INTERNAL MEDICINE | Facility: CLINIC | Age: 83
End: 2018-12-06
Payer: MEDICARE

## 2018-12-06 VITALS
WEIGHT: 176.38 LBS | HEART RATE: 74 BPM | TEMPERATURE: 97 F | SYSTOLIC BLOOD PRESSURE: 112 MMHG | BODY MASS INDEX: 28.34 KG/M2 | HEIGHT: 66 IN | DIASTOLIC BLOOD PRESSURE: 76 MMHG | OXYGEN SATURATION: 96 %

## 2018-12-06 DIAGNOSIS — G89.29 CHRONIC FOOT PAIN, UNSPECIFIED LATERALITY: ICD-10-CM

## 2018-12-06 DIAGNOSIS — M79.673 CHRONIC FOOT PAIN, UNSPECIFIED LATERALITY: ICD-10-CM

## 2018-12-06 DIAGNOSIS — R52 PAIN: Primary | ICD-10-CM

## 2018-12-06 DIAGNOSIS — R52 PAIN: ICD-10-CM

## 2018-12-06 PROCEDURE — 99213 OFFICE O/P EST LOW 20 MIN: CPT | Mod: S$PBB,,, | Performed by: FAMILY MEDICINE

## 2018-12-06 PROCEDURE — 73630 X-RAY EXAM OF FOOT: CPT | Mod: 26,LT,, | Performed by: RADIOLOGY

## 2018-12-06 PROCEDURE — 99214 OFFICE O/P EST MOD 30 MIN: CPT | Mod: PBBFAC,25,PO | Performed by: FAMILY MEDICINE

## 2018-12-06 PROCEDURE — 99999 PR PBB SHADOW E&M-EST. PATIENT-LVL IV: CPT | Mod: PBBFAC,,, | Performed by: FAMILY MEDICINE

## 2018-12-06 PROCEDURE — 73630 X-RAY EXAM OF FOOT: CPT | Mod: TC,FY,PO,LT

## 2018-12-06 RX ORDER — METHYLPREDNISOLONE 4 MG/1
TABLET ORAL
Qty: 1 PACKAGE | Refills: 0 | Status: SHIPPED | OUTPATIENT
Start: 2018-12-06 | End: 2018-12-27

## 2018-12-06 NOTE — PROGRESS NOTES
Subjective:       Patient ID: Francisca Pretty is a 89 y.o. female.    Chief Complaint: Ankle Pain (radiates up shin bone)    She has ongoing pain in the left mid foot intermittently.  She was walking over a door still felt a crack in her left ankle which is  painful.  This occurred several days ago.  She was also having intermittent ongoing muscle cramping mostly of her legs.  She denies any redness of the foot or ankle.  She reports some swelling of her foot.      Review of Systems   Constitutional: Negative for fatigue and fever.   Musculoskeletal: Positive for arthralgias.        Leg muscle cramping       Objective:      Physical Exam   Constitutional: She appears well-developed and well-nourished. No distress.   Musculoskeletal:   Tenderness mild swelling of the left foot medially with tenderness and no redness.  Mild tenderness of the ankle with no significant range of motion pain. No redness.       Office Visit on 07/12/2018   Component Date Value Ref Range Status    TSH 07/12/2018 3.582  0.400 - 4.000 uIU/mL Final    Free T4 07/12/2018 0.85  0.71 - 1.51 ng/dL Final    Glucose 07/12/2018 98  70 - 110 mg/dL Final    Sodium 07/12/2018 140  136 - 145 mmol/L Final    Potassium 07/12/2018 5.1  3.5 - 5.1 mmol/L Final    Chloride 07/12/2018 105  95 - 110 mmol/L Final    CO2 07/12/2018 26  23 - 29 mmol/L Final    BUN, Bld 07/12/2018 18  8 - 23 mg/dL Final    Calcium 07/12/2018 9.9  8.7 - 10.5 mg/dL Final    Creatinine 07/12/2018 1.1  0.5 - 1.4 mg/dL Final    Albumin 07/12/2018 3.8  3.5 - 5.2 g/dL Final    Phosphorus 07/12/2018 2.9  2.7 - 4.5 mg/dL Final    eGFR if  07/12/2018 51.4* >60 mL/min/1.73 m^2 Final    eGFR if non African American 07/12/2018 44.6* >60 mL/min/1.73 m^2 Final    Anion Gap 07/12/2018 9  8 - 16 mmol/L Final     Assessment:       1. Pain        Plan:     X-ray foot and ankle.  Medrol Dosepak warm soaks and rest.  Podiatry referral.  Discontinue continue HCTZ due  to muscle cramping and follow up in 1 month.  Recent CMP was reviewed.  Chronic kidney disease is stable    Pain  -     X-Ray Foot Complete Left; Future; Expected date: 12/06/2018  -     X-Ray Ankle Complete Left; Future; Expected date: 12/06/2018

## 2018-12-11 ENCOUNTER — OFFICE VISIT (OUTPATIENT)
Dept: PODIATRY | Facility: CLINIC | Age: 83
End: 2018-12-11
Payer: MEDICARE

## 2018-12-11 VITALS
RESPIRATION RATE: 16 BRPM | HEART RATE: 71 BPM | DIASTOLIC BLOOD PRESSURE: 89 MMHG | BODY MASS INDEX: 28.74 KG/M2 | WEIGHT: 178.81 LBS | SYSTOLIC BLOOD PRESSURE: 168 MMHG | HEIGHT: 66 IN

## 2018-12-11 DIAGNOSIS — N18.30 CHRONIC KIDNEY DISEASE (CKD), STAGE III (MODERATE): ICD-10-CM

## 2018-12-11 DIAGNOSIS — M19.072 OSTEOARTHRITIS OF LEFT ANKLE OR FOOT: Primary | ICD-10-CM

## 2018-12-11 DIAGNOSIS — M54.16 SPINAL STENOSIS OF LUMBAR REGION WITH RADICULOPATHY: ICD-10-CM

## 2018-12-11 DIAGNOSIS — M77.42 METATARSALGIA OF LEFT FOOT: ICD-10-CM

## 2018-12-11 DIAGNOSIS — M48.061 SPINAL STENOSIS OF LUMBAR REGION WITH RADICULOPATHY: ICD-10-CM

## 2018-12-11 DIAGNOSIS — M25.572 PAIN IN LEFT ANKLE AND JOINTS OF LEFT FOOT: ICD-10-CM

## 2018-12-11 PROCEDURE — 99999 PR PBB SHADOW E&M-EST. PATIENT-LVL III: CPT | Mod: PBBFAC,,, | Performed by: PODIATRIST

## 2018-12-11 PROCEDURE — 99203 OFFICE O/P NEW LOW 30 MIN: CPT | Mod: S$PBB,,, | Performed by: PODIATRIST

## 2018-12-11 PROCEDURE — 99213 OFFICE O/P EST LOW 20 MIN: CPT | Mod: PBBFAC | Performed by: PODIATRIST

## 2018-12-11 NOTE — PROGRESS NOTES
Subjective:       Patient ID: Francisca Pretty is a 89 y.o. female.    Chief Complaint: Foot Problem (Left foot pain, described as aches and cramps, rates pain 7/10, wears tennis shoes, non-Diabetic Pt, PCP Dr. Serrano)      HPI: Francisca Pretty presents to the clinic today for evaluation concerning stated moderate pains to the left foot/ankle at the dorsal and dorsal lateral aspects. Patient states pains are approx. 7/10. Pains are described as moderate. Pains have been present for duration of several weeks to a month or so. Patient states the pains are exacerbated with walking and standing and prolonged activities. States prior medical evaluation by a MD/DO/DPM/NP. States no NSAIDs due to CKD, but has been taking Tylenol.  Her primary care provider did also dispense a Medrol Dose Pack, which the patient states has been somewhat helpful. Trauma is not stated. Patient's Primary Care Provider is Fermín Serrano MD.  She presents this afternoon with her .  She typically ambulates with a rolling walker.  States mild swelling. Patient does have a history of spinal stenosis with radiculopathy.    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    Numerous moles     Obesity     Osteoarthritis     Right hip    Spinal stenosis, lumbar 1/2012    Subclinical hypothyroidism        Family History   Problem Relation Age of Onset    Cancer Father         Throat    Appendicitis Mother     Heart attack Brother     Leukemia Brother        Social History     Socioeconomic History    Marital status: Single     Spouse name: Not on file    Number of children: 3    Years of education: Not on file    Highest education level: Not on file   Social Needs    Financial resource strain: Not on file    Food insecurity - worry: Not on file    Food  "insecurity - inability: Not on file    Transportation needs - medical: Not on file    Transportation needs - non-medical: Not on file   Occupational History    Occupation: Homemaker   Tobacco Use    Smoking status: Never Smoker    Smokeless tobacco: Never Used   Substance and Sexual Activity    Alcohol use: Yes     Alcohol/week: 8.4 oz     Types: 14 Glasses of wine per week     Comment: 2 glasses wine q day    Drug use: Not on file    Sexual activity: No   Other Topics Concern    Not on file   Social History Narrative    Not on file       Past Surgical History:   Procedure Laterality Date    HEMORRHOID SURGERY      KNEE ARTHROSCOPY  4/2006    Right    LUMBAR SPINE SURGERY  4/14    Spinal stenosis    SKIN CANCER EXCISION      Nose    TEAR DUCT SURGERY      Left    TOTAL KNEE ARTHROPLASTY Right 9/2008    TOTAL KNEE ARTHROPLASTY Left 11/14       Review of Systems   Constitutional: Negative for chills, fatigue and fever.   HENT: Negative for hearing loss.    Eyes: Negative for photophobia and visual disturbance.   Respiratory: Negative for cough, chest tightness, shortness of breath and wheezing.    Cardiovascular: Negative for chest pain and palpitations.   Gastrointestinal: Negative for constipation, diarrhea, nausea and vomiting.   Endocrine: Negative for cold intolerance and heat intolerance.   Genitourinary: Negative for flank pain.   Musculoskeletal: Positive for arthralgias, back pain and gait problem. Negative for neck pain and neck stiffness.   Skin: Negative for wound.   Neurological: Negative for light-headedness, numbness and headaches.   Psychiatric/Behavioral: Negative for sleep disturbance.         Objective:   BP (!) 168/89 (BP Location: Left arm, Patient Position: Sitting, BP Method: Medium (Automatic))   Pulse 71   Resp 16   Ht 5' 6" (1.676 m)   Wt 81.1 kg (178 lb 12.7 oz)   BMI 28.86 kg/m²     X-Ray Foot Complete Left  Narrative: EXAMINATION:  XR FOOT COMPLETE 3 VIEW " LEFT    CLINICAL HISTORY:  . Pain, unspecified    TECHNIQUE:  AP, lateral, and oblique views of the foot were performed.    COMPARISON:  None    FINDINGS:  There is no evidence to suggest acute fracture or dislocation.  The joint spaces appear to be relatively well maintained.  Small dorsal and plantar calcaneal enthesophytes noted.  Impression: As above    Electronically signed by: Dougie Yañez,   Date:    12/06/2018  Time:    15:10  X-Ray Ankle Complete Left  Narrative: EXAMINATION:  XR ANKLE COMPLETE 3 VIEW LEFT    CLINICAL HISTORY:  Pain, unspecified    TECHNIQUE:  AP, lateral and oblique views of the left ankle were performed.    COMPARISON:  None    FINDINGS:  No acute fracture or dislocation.  Small dorsal and plantar calcaneal enthesophytes noted.  Mild degenerative changes at the ankle joint.  There appears to be a well corticated ossification in the region of the medial malleolus either representing an accessory ossicle or sequela of old injury.  Impression: As above    Electronically signed by: Dougie Yañez,   Date:    12/06/2018  Time:    15:10      LOWER EXTREMITY PHYSICAL EXAMINATION    VASCULAR: The right DP pulse is 1/4 and the left DP is 14. The right PT pulse is 1/4 and the left PT pulse is 1/4. Proximal to distal, warm to warm. Dependent rubor is absent. Elevation palor is absent. Capillary refill time is less than 3 seconds. Hair growth is sparse, but is appreciated to the dorsal foot and digits.    DERMATOLOGY: Skin is supple, dry and intact. No ecchymosis is noted. No hypertrophic skin formation. No erythema or cellulitis is noted.     NEUROLOGY: Proprioception is intact. Sensation to light touch is intact. Protective sensation is intact via 5.07 [place] monofilament. Straight leg raise is negative. Negative Tinel's Sign and negative Valleix sign. No neurological sensations with compression of the area of Hernandez's Nerve in the area of the Abductor Hallucis muscle belly. Upon  palpation of the interspaces, there are no neurological sensations stated that radiate proximal or distal. Upon compression of the metatarsal heads from medial to lateral, no neurological sensations or symptoms are stated. Deep tendon reflexes to the lower extremity are WNL.  Straight leg testing is negative.    ORTHOPEDIC: Manual Muscle Testing is 5/5 in all planes on the left, without pains, with and without resistance. No pains to palpation of the medial or lateral ankle ligaments. No discomfort to palpation of the posterior tibial tendon, peroneal tendon, Achilles tendon or the anterior ankle tendons.  Moderate discomfort palpation of the Lisfranc joint and midtarsal joint, left foot. Minimal palpable underlying osteoarthrosis is noted. Stress abduction and adduction of the midfoot is not painful.  Slight discomfort to palpation of the anterior ankle gutters.  No discomfort along the course of the immediate lateral or medial hindfoot or ankle.  Slight edema is noted. No pain to palpation of the 1st metatarsophalangeal joint. No discomfort palpation of dorsal metatarsal to suggest a stress fracture.  Slight metatarsalgia is noted.  Gait pattern is non-antalgic.    Assessment:     1. Osteoarthritis of left ankle or foot    2. Pain in left ankle and joints of left foot    3. Spinal stenosis of lumbar region with radiculopathy    4. Chronic kidney disease (CKD), stage III (moderate)    5. Metatarsalgia of left foot          Plan:     Osteoarthritis of left ankle or foot  Pain in left ankle and joints of left foot  Metatarsalgia of left foot  Thorough discussion is had with the patient today, concerning the diagnosis, its etiology, and the treatment algorithm at present.  XRAYS are reviewed in detail with the patient. All questions and concerns regarding findings and its/their implications are outlined and discussed.    Patient needs prolonged protected weight-bearing. Aircast is fitted and dispensed, but patient  states that is not comfortable, thus she does not take it.    Patient may continue Medrol Dosepak with 1 refill as per her PCP, Fermín Serrano MD.  Patient will follow up in approximately 10 days.        Spinal stenosis of lumbar region with radiculopathy  Patient advised to follow up with Primary Care Physician for management of comorbid states.    Chronic kidney disease (CKD), stage III (moderate)  Patient advised to follow up with Nephrologist for management of kidney pathology.          Future Appointments   Date Time Provider Department Center   12/20/2018  1:30 PM Josue Tarango DPM [facility]   1/10/2019 11:30 AM Fermín Serrano MD [facility] [place]

## 2018-12-11 NOTE — LETTER
December 11, 2018      Fermín Serrano MD  [address] [place] [address] Dr [address] [id number]           [facility]  [address] [place] [address]  [place] [address]  Phone: [phone]  Fax: [phone]          Patient: Francisca Pretty   MR Number: [id number]   YOB: 1929   Date of Visit: 12/11/2018       Dear Dr. Fermín Serrano:    Thank you for referring Francisca Pretty to me for evaluation. Attached you will find relevant portions of my assessment and plan of care.    If you have questions, please do not hesitate to call me. I look forward to following Francisca Pretty along with you.    Sincerely,    Josue Tarango, JAYLIN    Enclosure  CC:  No Recipients    If you would like to receive this communication electronically, please contact [email or web address] or [phone] to request more information on [employer] access.    For providers and/or their staff who would like to refer a patient to [facility], please contact us through our one-stop-shop provider referral line, [facility] , at [phone].    If you feel you have received this communication in error or would no longer like to receive these types of communications, please e-mail [email or web address]

## 2019-01-10 ENCOUNTER — OFFICE VISIT (OUTPATIENT)
Dept: INTERNAL MEDICINE | Facility: CLINIC | Age: 84
End: 2019-01-10
Payer: MEDICARE

## 2019-01-10 VITALS
WEIGHT: 181 LBS | DIASTOLIC BLOOD PRESSURE: 88 MMHG | OXYGEN SATURATION: 98 % | SYSTOLIC BLOOD PRESSURE: 120 MMHG | TEMPERATURE: 98 F | HEART RATE: 87 BPM | BODY MASS INDEX: 29.21 KG/M2

## 2019-01-10 DIAGNOSIS — N18.30 CHRONIC KIDNEY DISEASE (CKD), STAGE III (MODERATE): ICD-10-CM

## 2019-01-10 DIAGNOSIS — G47.62 NOCTURNAL LEG CRAMPS: ICD-10-CM

## 2019-01-10 DIAGNOSIS — E03.8 SUBCLINICAL HYPOTHYROIDISM: ICD-10-CM

## 2019-01-10 DIAGNOSIS — I77.1 TORTUOUS AORTA: ICD-10-CM

## 2019-01-10 DIAGNOSIS — I10 ESSENTIAL HYPERTENSION: Primary | ICD-10-CM

## 2019-01-10 LAB
ALBUMIN SERPL BCP-MCNC: 3.6 G/DL
ALP SERPL-CCNC: 46 U/L
ALT SERPL W/O P-5'-P-CCNC: 10 U/L
ANION GAP SERPL CALC-SCNC: 7 MMOL/L
AST SERPL-CCNC: 22 U/L
BACTERIA #/AREA URNS AUTO: NORMAL /HPF
BASOPHILS # BLD AUTO: 0.06 K/UL
BASOPHILS NFR BLD: 0.9 %
BILIRUB SERPL-MCNC: 0.5 MG/DL
BILIRUB UR QL STRIP: NEGATIVE
BUN SERPL-MCNC: 14 MG/DL
CALCIUM SERPL-MCNC: 9.3 MG/DL
CHLORIDE SERPL-SCNC: 109 MMOL/L
CHOLEST SERPL-MCNC: 175 MG/DL
CHOLEST/HDLC SERPL: 2.1 {RATIO}
CLARITY UR REFRACT.AUTO: CLEAR
CO2 SERPL-SCNC: 26 MMOL/L
COLOR UR AUTO: YELLOW
CREAT SERPL-MCNC: 1.1 MG/DL
DIFFERENTIAL METHOD: ABNORMAL
EOSINOPHIL # BLD AUTO: 0.1 K/UL
EOSINOPHIL NFR BLD: 1.7 %
ERYTHROCYTE [DISTWIDTH] IN BLOOD BY AUTOMATED COUNT: 14.5 %
EST. GFR  (AFRICAN AMERICAN): 51.4 ML/MIN/1.73 M^2
EST. GFR  (NON AFRICAN AMERICAN): 44.6 ML/MIN/1.73 M^2
GLUCOSE SERPL-MCNC: 112 MG/DL
GLUCOSE UR QL STRIP: NEGATIVE
HCT VFR BLD AUTO: 41.2 %
HDLC SERPL-MCNC: 82 MG/DL
HDLC SERPL: 46.9 %
HGB BLD-MCNC: 13 G/DL
HGB UR QL STRIP: NEGATIVE
IMM GRANULOCYTES # BLD AUTO: 0.01 K/UL
IMM GRANULOCYTES NFR BLD AUTO: 0.2 %
KETONES UR QL STRIP: NEGATIVE
LDLC SERPL CALC-MCNC: 81.2 MG/DL
LEUKOCYTE ESTERASE UR QL STRIP: ABNORMAL
LYMPHOCYTES # BLD AUTO: 1.3 K/UL
LYMPHOCYTES NFR BLD: 20.5 %
MCH RBC QN AUTO: 30.4 PG
MCHC RBC AUTO-ENTMCNC: 31.6 G/DL
MCV RBC AUTO: 96 FL
MICROSCOPIC COMMENT: NORMAL
MONOCYTES # BLD AUTO: 0.4 K/UL
MONOCYTES NFR BLD: 5.9 %
NEUTROPHILS # BLD AUTO: 4.6 K/UL
NEUTROPHILS NFR BLD: 70.8 %
NITRITE UR QL STRIP: NEGATIVE
NONHDLC SERPL-MCNC: 93 MG/DL
NRBC BLD-RTO: 0 /100 WBC
PH UR STRIP: 6 [PH] (ref 5–8)
PLATELET # BLD AUTO: 224 K/UL
PMV BLD AUTO: 10.7 FL
POTASSIUM SERPL-SCNC: 5.2 MMOL/L
PROT SERPL-MCNC: 6.7 G/DL
PROT UR QL STRIP: NEGATIVE
RBC # BLD AUTO: 4.28 M/UL
RBC #/AREA URNS AUTO: 1 /HPF (ref 0–4)
SODIUM SERPL-SCNC: 142 MMOL/L
SP GR UR STRIP: 1.01 (ref 1–1.03)
SQUAMOUS #/AREA URNS AUTO: 1 /HPF
T4 FREE SERPL-MCNC: 0.85 NG/DL
TRIGL SERPL-MCNC: 59 MG/DL
TSH SERPL DL<=0.005 MIU/L-ACNC: 4.47 UIU/ML
URN SPEC COLLECT METH UR: ABNORMAL
WBC # BLD AUTO: 6.43 K/UL
WBC #/AREA URNS AUTO: 3 /HPF (ref 0–5)

## 2019-01-10 PROCEDURE — 99214 OFFICE O/P EST MOD 30 MIN: CPT | Mod: S$PBB,,, | Performed by: FAMILY MEDICINE

## 2019-01-10 PROCEDURE — 99999 PR PBB SHADOW E&M-EST. PATIENT-LVL III: ICD-10-PCS | Mod: PBBFAC,,, | Performed by: FAMILY MEDICINE

## 2019-01-10 PROCEDURE — 80053 COMPREHEN METABOLIC PANEL: CPT

## 2019-01-10 PROCEDURE — 81001 URINALYSIS AUTO W/SCOPE: CPT

## 2019-01-10 PROCEDURE — 99999 PR PBB SHADOW E&M-EST. PATIENT-LVL III: CPT | Mod: PBBFAC,,, | Performed by: FAMILY MEDICINE

## 2019-01-10 PROCEDURE — 99214 PR OFFICE/OUTPT VISIT, EST, LEVL IV, 30-39 MIN: ICD-10-PCS | Mod: S$PBB,,, | Performed by: FAMILY MEDICINE

## 2019-01-10 PROCEDURE — 84439 ASSAY OF FREE THYROXINE: CPT

## 2019-01-10 PROCEDURE — 84443 ASSAY THYROID STIM HORMONE: CPT

## 2019-01-10 PROCEDURE — 99213 OFFICE O/P EST LOW 20 MIN: CPT | Mod: PBBFAC,PO | Performed by: FAMILY MEDICINE

## 2019-01-10 PROCEDURE — 85025 COMPLETE CBC W/AUTO DIFF WBC: CPT

## 2019-01-10 PROCEDURE — 80061 LIPID PANEL: CPT

## 2019-01-10 NOTE — PROGRESS NOTES
Subjective:       Patient ID: Francisca Pretty is a 89 y.o. female.    Chief Complaint: f/u HTN    Follow-up hypertension abnormal TSH tortuous aorta chronic kidney disease nocturnal leg cramps.  She denies headache chest pain palpitations shortness of breath.  She denies edema. She continues with lisinopril 10 mg a day.  She has occasional nocturnal leg cramps but no claudication.  Previous ankle foot pain has improved with rest.  She reports that she received pneumococcal immunization that caused her to be ill and is not interested re vac for this.      Review of Systems   Constitutional: Negative for activity change, appetite change, fatigue and unexpected weight change.   Respiratory: Negative for cough, chest tightness, shortness of breath and wheezing.    Cardiovascular: Negative for chest pain, palpitations and leg swelling.        Denies lightheadedness   Gastrointestinal: Negative for abdominal pain.   Genitourinary: Negative for difficulty urinating.   Neurological: Negative for dizziness, speech difficulty, weakness and headaches.       Objective:      Physical Exam   Constitutional: She is oriented to person, place, and time. She appears well-developed and well-nourished. No distress.   Neck: Neck supple. No thyromegaly present.   Cardiovascular: Normal rate, regular rhythm and normal heart sounds.   No murmur heard.  Pulmonary/Chest: Effort normal and breath sounds normal. No respiratory distress. She has no wheezes.   Abdominal: Soft. Bowel sounds are normal. She exhibits no mass. There is no tenderness.   Lymphadenopathy:     She has no cervical adenopathy.   Neurological: She is alert and oriented to person, place, and time.   Skin: She is not diaphoretic.       Office Visit on 07/12/2018   Component Date Value Ref Range Status    TSH 07/12/2018 3.582  0.400 - 4.000 uIU/mL Final    Free T4 07/12/2018 0.85  0.71 - 1.51 ng/dL Final    Glucose 07/12/2018 98  70 - 110 mg/dL Final    Sodium  07/12/2018 140  136 - 145 mmol/L Final    Potassium 07/12/2018 5.1  3.5 - 5.1 mmol/L Final    Chloride 07/12/2018 105  95 - 110 mmol/L Final    CO2 07/12/2018 26  23 - 29 mmol/L Final    BUN, Bld 07/12/2018 18  8 - 23 mg/dL Final    Calcium 07/12/2018 9.9  8.7 - 10.5 mg/dL Final    Creatinine 07/12/2018 1.1  0.5 - 1.4 mg/dL Final    Albumin 07/12/2018 3.8  3.5 - 5.2 g/dL Final    Phosphorus 07/12/2018 2.9  2.7 - 4.5 mg/dL Final    eGFR if  07/12/2018 51.4* >60 mL/min/1.73 m^2 Final    eGFR if non African American 07/12/2018 44.6* >60 mL/min/1.73 m^2 Final    Anion Gap 07/12/2018 9  8 - 16 mmol/L Final     Assessment:       1. Essential hypertension        Plan:   Blood pressure is controlled.  Continue lisinopril.  Lab was ordered.  Discussed significance of tortuous aorta with patient.  She is avoiding NSAIDs ibuprofen to chronic kidney disease.      Essential hypertension  -     CBC auto differential  -     Urinalysis  -     Comprehensive metabolic panel  -     Lipid panel  -     TSH

## 2019-02-20 DIAGNOSIS — I10 ESSENTIAL HYPERTENSION: ICD-10-CM

## 2019-02-20 RX ORDER — HYDROCHLOROTHIAZIDE 25 MG/1
TABLET ORAL
Qty: 30 TABLET | Refills: 11 | Status: SHIPPED | OUTPATIENT
Start: 2019-02-20 | End: 2020-02-04

## 2019-08-29 RX ORDER — LISINOPRIL 10 MG/1
TABLET ORAL
Qty: 90 TABLET | Refills: 3 | Status: SHIPPED | OUTPATIENT
Start: 2019-08-29 | End: 2020-10-02 | Stop reason: SDUPTHER

## 2019-10-02 ENCOUNTER — OFFICE VISIT (OUTPATIENT)
Dept: INTERNAL MEDICINE | Facility: CLINIC | Age: 84
End: 2019-10-02
Payer: MEDICARE

## 2019-10-02 VITALS
DIASTOLIC BLOOD PRESSURE: 76 MMHG | OXYGEN SATURATION: 96 % | TEMPERATURE: 98 F | HEART RATE: 90 BPM | HEIGHT: 66 IN | WEIGHT: 179.81 LBS | SYSTOLIC BLOOD PRESSURE: 100 MMHG | BODY MASS INDEX: 28.9 KG/M2

## 2019-10-02 DIAGNOSIS — H25.9 AGE-RELATED CATARACT OF BOTH EYES, UNSPECIFIED AGE-RELATED CATARACT TYPE: ICD-10-CM

## 2019-10-02 DIAGNOSIS — R25.1 TREMOR: ICD-10-CM

## 2019-10-02 DIAGNOSIS — I10 ESSENTIAL HYPERTENSION: ICD-10-CM

## 2019-10-02 DIAGNOSIS — R05.9 COUGH: ICD-10-CM

## 2019-10-02 DIAGNOSIS — Z01.818 PREOPERATIVE CLEARANCE: Primary | ICD-10-CM

## 2019-10-02 DIAGNOSIS — N18.30 CHRONIC KIDNEY DISEASE (CKD), STAGE III (MODERATE): ICD-10-CM

## 2019-10-02 PROCEDURE — 99214 PR OFFICE/OUTPT VISIT, EST, LEVL IV, 30-39 MIN: ICD-10-PCS | Mod: S$PBB,,, | Performed by: FAMILY MEDICINE

## 2019-10-02 PROCEDURE — 99213 OFFICE O/P EST LOW 20 MIN: CPT | Mod: PBBFAC,PO | Performed by: FAMILY MEDICINE

## 2019-10-02 PROCEDURE — 99999 PR PBB SHADOW E&M-EST. PATIENT-LVL III: ICD-10-PCS | Mod: PBBFAC,,, | Performed by: FAMILY MEDICINE

## 2019-10-02 PROCEDURE — 99214 OFFICE O/P EST MOD 30 MIN: CPT | Mod: S$PBB,,, | Performed by: FAMILY MEDICINE

## 2019-10-02 PROCEDURE — 99999 PR PBB SHADOW E&M-EST. PATIENT-LVL III: CPT | Mod: PBBFAC,,, | Performed by: FAMILY MEDICINE

## 2019-10-02 RX ORDER — BROMFENAC SODIUM 0.7 MG/ML
SOLUTION/ DROPS OPHTHALMIC
COMMUNITY
Start: 2019-10-02 | End: 2021-05-24

## 2019-10-02 RX ORDER — PREDNISOLONE ACETATE 10 MG/ML
SUSPENSION/ DROPS OPHTHALMIC
COMMUNITY
Start: 2019-10-02 | End: 2021-05-24

## 2019-10-02 NOTE — PROGRESS NOTES
Subjective:       Patient ID: Francisca Pretty is a 89 y.o. female.    Chief Complaint: Pre-op Exam and neck cramp    Preop  for cataract surgery.  Medical history includes hypertension and head tremor.  She has no medication allergies.  She denies headache chest pain palpitations shortness of breath or edema    Review of Systems   Constitutional: Negative for appetite change, chills, fatigue, fever and unexpected weight change.   HENT: Negative for congestion.    Respiratory: Positive for cough. Negative for chest tightness, shortness of breath and wheezing.         Chronic Occasional cough she is concerned might be blood pressure medicine related.  Recommend returning after cataract surgery for evaluation.   Cardiovascular: Negative for chest pain, palpitations and leg swelling.        Denies lightheadedness   Gastrointestinal: Negative for abdominal pain.   Genitourinary: Negative for difficulty urinating.   Musculoskeletal: Positive for neck stiffness.        Some neck stiffness she thinks is related to her head tremor   Skin: Negative for rash.   Neurological: Positive for tremors. Negative for dizziness, speech difficulty, light-headedness, numbness and headaches.   Psychiatric/Behavioral: Negative for confusion and dysphoric mood.       Objective:      Physical Exam   Constitutional: She is oriented to person, place, and time. She appears well-developed and well-nourished. No distress.   HENT:   Right Ear: External ear normal.   Left Ear: External ear normal.   Mouth/Throat: Oropharynx is clear and moist.   Eyes: Pupils are equal, round, and reactive to light.   Cataract seen bilaterally   Neck: Neck supple. No JVD present. No thyromegaly present.   Cardiovascular: Normal rate, regular rhythm and normal heart sounds. Exam reveals no gallop.   No murmur heard.  Pulmonary/Chest: Effort normal and breath sounds normal. No respiratory distress. She has no wheezes. She has no rales.   Abdominal: Soft. Bowel  sounds are normal. She exhibits no mass. There is no tenderness.   Lymphadenopathy:     She has no cervical adenopathy.   Neurological: She is alert and oriented to person, place, and time.   Mild head tremor   Skin: She is not diaphoretic.       Office Visit on 01/10/2019   Component Date Value Ref Range Status    WBC 01/10/2019 6.43  3.90 - 12.70 K/uL Final    RBC 01/10/2019 4.28  4.00 - 5.40 M/uL Final    Hemoglobin 01/10/2019 13.0  12.0 - 16.0 g/dL Final    Hematocrit 01/10/2019 41.2  37.0 - 48.5 % Final    Mean Corpuscular Volume 01/10/2019 96  82 - 98 fL Final    Mean Corpuscular Hemoglobin 01/10/2019 30.4  27.0 - 31.0 pg Final    Mean Corpuscular Hemoglobin Conc 01/10/2019 31.6* 32.0 - 36.0 g/dL Final    RDW 01/10/2019 14.5  11.5 - 14.5 % Final    Platelets 01/10/2019 224  150 - 350 K/uL Final    MPV 01/10/2019 10.7  9.2 - 12.9 fL Final    Immature Granulocytes 01/10/2019 0.2  0.0 - 0.5 % Final    Gran # (ANC) 01/10/2019 4.6  1.8 - 7.7 K/uL Final    Immature Grans (Abs) 01/10/2019 0.01  0.00 - 0.04 K/uL Final    Lymph # 01/10/2019 1.3  1.0 - 4.8 K/uL Final    Mono # 01/10/2019 0.4  0.3 - 1.0 K/uL Final    Eos # 01/10/2019 0.1  0.0 - 0.5 K/uL Final    Baso # 01/10/2019 0.06  0.00 - 0.20 K/uL Final    nRBC 01/10/2019 0  0 /100 WBC Final    Gran% 01/10/2019 70.8  38.0 - 73.0 % Final    Lymph% 01/10/2019 20.5  18.0 - 48.0 % Final    Mono% 01/10/2019 5.9  4.0 - 15.0 % Final    Eosinophil% 01/10/2019 1.7  0.0 - 8.0 % Final    Basophil% 01/10/2019 0.9  0.0 - 1.9 % Final    Differential Method 01/10/2019 Automated   Final    Specimen UA 01/10/2019 Urine, Clean Catch   Final    Color, UA 01/10/2019 Yellow  Yellow, Straw, Domitila Final    Appearance, UA 01/10/2019 Clear  Clear Final    pH, UA 01/10/2019 6.0  5.0 - 8.0 Final    Specific Gravity, UA 01/10/2019 1.015  1.005 - 1.030 Final    Protein, UA 01/10/2019 Negative  Negative Final    Glucose, UA 01/10/2019 Negative  Negative Final     Ketones, UA 01/10/2019 Negative  Negative Final    Bilirubin (UA) 01/10/2019 Negative  Negative Final    Occult Blood UA 01/10/2019 Negative  Negative Final    Nitrite, UA 01/10/2019 Negative  Negative Final    Leukocytes, UA 01/10/2019 Trace* Negative Final    Sodium 01/10/2019 142  136 - 145 mmol/L Final    Potassium 01/10/2019 5.2* 3.5 - 5.1 mmol/L Final    Chloride 01/10/2019 109  95 - 110 mmol/L Final    CO2 01/10/2019 26  23 - 29 mmol/L Final    Glucose 01/10/2019 112* 70 - 110 mg/dL Final    BUN, Bld 01/10/2019 14  8 - 23 mg/dL Final    Creatinine 01/10/2019 1.1  0.5 - 1.4 mg/dL Final    Calcium 01/10/2019 9.3  8.7 - 10.5 mg/dL Final    Total Protein 01/10/2019 6.7  6.0 - 8.4 g/dL Final    Albumin 01/10/2019 3.6  3.5 - 5.2 g/dL Final    Total Bilirubin 01/10/2019 0.5  0.1 - 1.0 mg/dL Final    Alkaline Phosphatase 01/10/2019 46* 55 - 135 U/L Final    AST 01/10/2019 22  10 - 40 U/L Final    ALT 01/10/2019 10  10 - 44 U/L Final    Anion Gap 01/10/2019 7* 8 - 16 mmol/L Final    eGFR if  01/10/2019 51.4* >60 mL/min/1.73 m^2 Final    eGFR if non African American 01/10/2019 44.6* >60 mL/min/1.73 m^2 Final    Cholesterol 01/10/2019 175  120 - 199 mg/dL Final    Triglycerides 01/10/2019 59  30 - 150 mg/dL Final    HDL 01/10/2019 82* 40 - 75 mg/dL Final    LDL Cholesterol 01/10/2019 81.2  63.0 - 159.0 mg/dL Final    Hdl/Cholesterol Ratio 01/10/2019 46.9  20.0 - 50.0 % Final    Total Cholesterol/HDL Ratio 01/10/2019 2.1  2.0 - 5.0 Final    Non-HDL Cholesterol 01/10/2019 93  mg/dL Final    TSH 01/10/2019 4.466* 0.400 - 4.000 uIU/mL Final    RBC, UA 01/10/2019 1  0 - 4 /hpf Final    WBC, UA 01/10/2019 3  0 - 5 /hpf Final    Bacteria 01/10/2019 Rare  None-Occ /hpf Final    Squam Epithel, UA 01/10/2019 1  /hpf Final    Microscopic Comment 01/10/2019 SEE COMMENT   Final    Free T4 01/10/2019 0.85  0.71 - 1.51 ng/dL Final     Assessment:       No diagnosis found.    Plan:      Blood pressure 100/76.  Preop form completed.  Return for evaluation of cough when she is ready.  She has chronic renal disease stage 3 avoiding NSAIDs She declined flu immunization.  There are no diagnoses linked to this encounter.

## 2020-02-04 ENCOUNTER — OFFICE VISIT (OUTPATIENT)
Dept: INTERNAL MEDICINE | Facility: CLINIC | Age: 85
End: 2020-02-04
Payer: MEDICARE

## 2020-02-04 VITALS
OXYGEN SATURATION: 97 % | WEIGHT: 178.38 LBS | HEART RATE: 91 BPM | BODY MASS INDEX: 28.67 KG/M2 | HEIGHT: 66 IN | SYSTOLIC BLOOD PRESSURE: 132 MMHG | TEMPERATURE: 98 F | DIASTOLIC BLOOD PRESSURE: 80 MMHG

## 2020-02-04 DIAGNOSIS — R53.83 FATIGUE, UNSPECIFIED TYPE: ICD-10-CM

## 2020-02-04 DIAGNOSIS — I10 ESSENTIAL HYPERTENSION: ICD-10-CM

## 2020-02-04 DIAGNOSIS — I77.1 TORTUOUS AORTA: ICD-10-CM

## 2020-02-04 DIAGNOSIS — Z00.00 ANNUAL PHYSICAL EXAM: Primary | ICD-10-CM

## 2020-02-04 DIAGNOSIS — N18.30 CHRONIC KIDNEY DISEASE (CKD), STAGE III (MODERATE): ICD-10-CM

## 2020-02-04 PROCEDURE — 99999 PR PBB SHADOW E&M-EST. PATIENT-LVL III: CPT | Mod: PBBFAC,,, | Performed by: FAMILY MEDICINE

## 2020-02-04 PROCEDURE — 99999 PR PBB SHADOW E&M-EST. PATIENT-LVL III: ICD-10-PCS | Mod: PBBFAC,,, | Performed by: FAMILY MEDICINE

## 2020-02-04 PROCEDURE — 99214 PR OFFICE/OUTPT VISIT, EST, LEVL IV, 30-39 MIN: ICD-10-PCS | Mod: S$PBB,,, | Performed by: FAMILY MEDICINE

## 2020-02-04 PROCEDURE — 99213 OFFICE O/P EST LOW 20 MIN: CPT | Mod: PBBFAC,PO | Performed by: FAMILY MEDICINE

## 2020-02-04 PROCEDURE — 99214 OFFICE O/P EST MOD 30 MIN: CPT | Mod: S$PBB,,, | Performed by: FAMILY MEDICINE

## 2020-02-04 NOTE — PROGRESS NOTES
Subjective:       Patient ID: Francisca Pretty is a 89 y.o. female.    Chief Complaint: Annual Exam    Annual exam.  Follow-up hypertension cough.  She is postop cataract surgery doing well.  Dermatology removed skin cancer from her left hand recently.  She denies headache chest pain palpitations shortness of breath or edema.  She reports cough is much improved.    Review of Systems   Constitutional: Positive for fatigue. Negative for activity change, appetite change and unexpected weight change.   HENT: Negative for congestion, dental problem, ear pain, hearing loss, sneezing, sore throat, tinnitus and trouble swallowing.    Eyes: Negative for pain, redness, itching and visual disturbance.   Respiratory: Positive for cough. Negative for chest tightness, shortness of breath and wheezing.    Cardiovascular: Negative for chest pain, palpitations and leg swelling.   Gastrointestinal: Negative for abdominal distention, abdominal pain, blood in stool, constipation, diarrhea, nausea and vomiting.   Genitourinary: Negative for difficulty urinating, dysuria, frequency, hematuria and urgency.   Musculoskeletal: Negative for arthralgias, back pain, joint swelling, neck pain and neck stiffness.   Skin: Negative for rash and wound.   Neurological: Negative for dizziness, tremors, syncope, weakness, light-headedness, numbness and headaches.   Hematological: Negative for adenopathy. Does not bruise/bleed easily.   Psychiatric/Behavioral: Negative for agitation, dysphoric mood and sleep disturbance. The patient is not nervous/anxious.        Objective:      Physical Exam   Constitutional: She is oriented to person, place, and time. She appears well-developed and well-nourished.   HENT:   Right Ear: External ear normal.   Left Ear: External ear normal.   Nose: Nose normal.   Mouth/Throat: Oropharynx is clear and moist.   Eyes: Pupils are equal, round, and reactive to light. Conjunctivae and EOM are normal.   Neck: Normal range  of motion. Neck supple. No thyromegaly present.   Cardiovascular: Normal rate, regular rhythm and normal heart sounds.   No murmur heard.  Pulmonary/Chest: Effort normal and breath sounds normal. She has no wheezes. She has no rales.   Abdominal: Soft. Bowel sounds are normal. She exhibits no distension and no mass. There is no tenderness.   Lymphadenopathy:     She has no cervical adenopathy.   Neurological: She is alert and oriented to person, place, and time. She has normal reflexes. She displays normal reflexes. No cranial nerve deficit. She exhibits normal muscle tone. Coordination normal.   Skin: Skin is warm and dry. No rash noted.   Psychiatric: She has a normal mood and affect. Her behavior is normal. Judgment and thought content normal.       Office Visit on 01/10/2019   Component Date Value Ref Range Status    WBC 01/10/2019 6.43  3.90 - 12.70 K/uL Final    RBC 01/10/2019 4.28  4.00 - 5.40 M/uL Final    Hemoglobin 01/10/2019 13.0  12.0 - 16.0 g/dL Final    Hematocrit 01/10/2019 41.2  37.0 - 48.5 % Final    Mean Corpuscular Volume 01/10/2019 96  82 - 98 fL Final    Mean Corpuscular Hemoglobin 01/10/2019 30.4  27.0 - 31.0 pg Final    Mean Corpuscular Hemoglobin Conc 01/10/2019 31.6* 32.0 - 36.0 g/dL Final    RDW 01/10/2019 14.5  11.5 - 14.5 % Final    Platelets 01/10/2019 224  150 - 350 K/uL Final    MPV 01/10/2019 10.7  9.2 - 12.9 fL Final    Immature Granulocytes 01/10/2019 0.2  0.0 - 0.5 % Final    Gran # (ANC) 01/10/2019 4.6  1.8 - 7.7 K/uL Final    Immature Grans (Abs) 01/10/2019 0.01  0.00 - 0.04 K/uL Final    Lymph # 01/10/2019 1.3  1.0 - 4.8 K/uL Final    Mono # 01/10/2019 0.4  0.3 - 1.0 K/uL Final    Eos # 01/10/2019 0.1  0.0 - 0.5 K/uL Final    Baso # 01/10/2019 0.06  0.00 - 0.20 K/uL Final    nRBC 01/10/2019 0  0 /100 WBC Final    Gran% 01/10/2019 70.8  38.0 - 73.0 % Final    Lymph% 01/10/2019 20.5  18.0 - 48.0 % Final    Mono% 01/10/2019 5.9  4.0 - 15.0 % Final     Eosinophil% 01/10/2019 1.7  0.0 - 8.0 % Final    Basophil% 01/10/2019 0.9  0.0 - 1.9 % Final    Differential Method 01/10/2019 Automated   Final    Specimen UA 01/10/2019 Urine, Clean Catch   Final    Color, UA 01/10/2019 Yellow  Yellow, Straw, Domitila Final    Appearance, UA 01/10/2019 Clear  Clear Final    pH, UA 01/10/2019 6.0  5.0 - 8.0 Final    Specific Gravity, UA 01/10/2019 1.015  1.005 - 1.030 Final    Protein, UA 01/10/2019 Negative  Negative Final    Glucose, UA 01/10/2019 Negative  Negative Final    Ketones, UA 01/10/2019 Negative  Negative Final    Bilirubin (UA) 01/10/2019 Negative  Negative Final    Occult Blood UA 01/10/2019 Negative  Negative Final    Nitrite, UA 01/10/2019 Negative  Negative Final    Leukocytes, UA 01/10/2019 Trace* Negative Final    Sodium 01/10/2019 142  136 - 145 mmol/L Final    Potassium 01/10/2019 5.2* 3.5 - 5.1 mmol/L Final    Chloride 01/10/2019 109  95 - 110 mmol/L Final    CO2 01/10/2019 26  23 - 29 mmol/L Final    Glucose 01/10/2019 112* 70 - 110 mg/dL Final    BUN, Bld 01/10/2019 14  8 - 23 mg/dL Final    Creatinine 01/10/2019 1.1  0.5 - 1.4 mg/dL Final    Calcium 01/10/2019 9.3  8.7 - 10.5 mg/dL Final    Total Protein 01/10/2019 6.7  6.0 - 8.4 g/dL Final    Albumin 01/10/2019 3.6  3.5 - 5.2 g/dL Final    Total Bilirubin 01/10/2019 0.5  0.1 - 1.0 mg/dL Final    Alkaline Phosphatase 01/10/2019 46* 55 - 135 U/L Final    AST 01/10/2019 22  10 - 40 U/L Final    ALT 01/10/2019 10  10 - 44 U/L Final    Anion Gap 01/10/2019 7* 8 - 16 mmol/L Final    eGFR if  01/10/2019 51.4* >60 mL/min/1.73 m^2 Final    eGFR if non African American 01/10/2019 44.6* >60 mL/min/1.73 m^2 Final    Cholesterol 01/10/2019 175  120 - 199 mg/dL Final    Triglycerides 01/10/2019 59  30 - 150 mg/dL Final    HDL 01/10/2019 82* 40 - 75 mg/dL Final    LDL Cholesterol 01/10/2019 81.2  63.0 - 159.0 mg/dL Final    Hdl/Cholesterol Ratio 01/10/2019 46.9  20.0 -  50.0 % Final    Total Cholesterol/HDL Ratio 01/10/2019 2.1  2.0 - 5.0 Final    Non-HDL Cholesterol 01/10/2019 93  mg/dL Final    TSH 01/10/2019 4.466* 0.400 - 4.000 uIU/mL Final    RBC, UA 01/10/2019 1  0 - 4 /hpf Final    WBC, UA 01/10/2019 3  0 - 5 /hpf Final    Bacteria 01/10/2019 Rare  None-Occ /hpf Final    Squam Epithel, UA 01/10/2019 1  /hpf Final    Microscopic Comment 01/10/2019 SEE COMMENT   Final    Free T4 01/10/2019 0.85  0.71 - 1.51 ng/dL Final     Assessment:       No diagnosis found.    Plan:     Blood pressure controlled 132/80.  Continue lisinopril.  Discussed need for [facility].  Lab was ordered.  She is voiding NSAIDs due chronic kidney disease.  She has torturous aorta.  Follow-up in 3 months.    There are no diagnoses linked to this encounter.

## 2020-02-05 ENCOUNTER — CLINICAL SUPPORT (OUTPATIENT)
Dept: INTERNAL MEDICINE | Facility: CLINIC | Age: 85
End: 2020-02-05
Payer: MEDICARE

## 2020-02-05 DIAGNOSIS — R53.83 FATIGUE, UNSPECIFIED TYPE: ICD-10-CM

## 2020-02-05 DIAGNOSIS — I10 ESSENTIAL HYPERTENSION: ICD-10-CM

## 2020-02-05 LAB
ALBUMIN SERPL BCP-MCNC: 3.7 G/DL (ref 3.5–5.2)
ALP SERPL-CCNC: 53 U/L (ref 55–135)
ALT SERPL W/O P-5'-P-CCNC: 16 U/L (ref 10–44)
ANION GAP SERPL CALC-SCNC: 9 MMOL/L (ref 8–16)
AST SERPL-CCNC: 29 U/L (ref 10–40)
BASOPHILS # BLD AUTO: 0.06 K/UL (ref 0–0.2)
BASOPHILS NFR BLD: 1.5 % (ref 0–1.9)
BILIRUB SERPL-MCNC: 0.3 MG/DL (ref 0.1–1)
BUN SERPL-MCNC: 14 MG/DL (ref 8–23)
CALCIUM SERPL-MCNC: 9.3 MG/DL (ref 8.7–10.5)
CHLORIDE SERPL-SCNC: 108 MMOL/L (ref 95–110)
CO2 SERPL-SCNC: 27 MMOL/L (ref 23–29)
CREAT SERPL-MCNC: 1.1 MG/DL (ref 0.5–1.4)
DIFFERENTIAL METHOD: ABNORMAL
EOSINOPHIL # BLD AUTO: 0.1 K/UL (ref 0–0.5)
EOSINOPHIL NFR BLD: 3.5 % (ref 0–8)
ERYTHROCYTE [DISTWIDTH] IN BLOOD BY AUTOMATED COUNT: 14.5 % (ref 11.5–14.5)
EST. GFR  (AFRICAN AMERICAN): 51.1 ML/MIN/1.73 M^2
EST. GFR  (NON AFRICAN AMERICAN): 44.3 ML/MIN/1.73 M^2
GLUCOSE SERPL-MCNC: 96 MG/DL (ref 70–110)
HCT VFR BLD AUTO: 44.7 % (ref 37–48.5)
HGB BLD-MCNC: 13.5 G/DL (ref 12–16)
IMM GRANULOCYTES # BLD AUTO: 0.01 K/UL (ref 0–0.04)
IMM GRANULOCYTES NFR BLD AUTO: 0.3 % (ref 0–0.5)
LYMPHOCYTES # BLD AUTO: 1.7 K/UL (ref 1–4.8)
LYMPHOCYTES NFR BLD: 42.7 % (ref 18–48)
MCH RBC QN AUTO: 29.2 PG (ref 27–31)
MCHC RBC AUTO-ENTMCNC: 30.2 G/DL (ref 32–36)
MCV RBC AUTO: 97 FL (ref 82–98)
MONOCYTES # BLD AUTO: 0.4 K/UL (ref 0.3–1)
MONOCYTES NFR BLD: 10.9 % (ref 4–15)
NEUTROPHILS # BLD AUTO: 1.6 K/UL (ref 1.8–7.7)
NEUTROPHILS NFR BLD: 41.1 % (ref 38–73)
NRBC BLD-RTO: 0 /100 WBC
PLATELET # BLD AUTO: 206 K/UL (ref 150–350)
PMV BLD AUTO: 10.9 FL (ref 9.2–12.9)
POTASSIUM SERPL-SCNC: 4.7 MMOL/L (ref 3.5–5.1)
PROT SERPL-MCNC: 7 G/DL (ref 6–8.4)
RBC # BLD AUTO: 4.62 M/UL (ref 4–5.4)
SODIUM SERPL-SCNC: 144 MMOL/L (ref 136–145)
T4 FREE SERPL-MCNC: 0.8 NG/DL (ref 0.71–1.51)
TSH SERPL DL<=0.005 MIU/L-ACNC: 4.45 UIU/ML (ref 0.4–4)
WBC # BLD AUTO: 3.96 K/UL (ref 3.9–12.7)

## 2020-02-05 PROCEDURE — 80053 COMPREHEN METABOLIC PANEL: CPT

## 2020-02-05 PROCEDURE — 85025 COMPLETE CBC W/AUTO DIFF WBC: CPT

## 2020-02-05 PROCEDURE — 84439 ASSAY OF FREE THYROXINE: CPT

## 2020-02-05 PROCEDURE — 84443 ASSAY THYROID STIM HORMONE: CPT

## 2020-05-11 PROBLEM — Z00.00 ANNUAL PHYSICAL EXAM: Status: RESOLVED | Noted: 2017-06-26 | Resolved: 2020-05-11

## 2020-10-03 RX ORDER — LISINOPRIL 10 MG/1
10 TABLET ORAL DAILY
Qty: 90 TABLET | Refills: 3 | Status: SHIPPED | OUTPATIENT
Start: 2020-10-03 | End: 2021-10-18

## 2021-01-08 ENCOUNTER — IMMUNIZATION (OUTPATIENT)
Dept: INTERNAL MEDICINE | Facility: CLINIC | Age: 86
End: 2021-01-08
Payer: MEDICARE

## 2021-01-08 DIAGNOSIS — Z23 NEED FOR VACCINATION: ICD-10-CM

## 2021-01-08 PROCEDURE — 91300 COVID-19, MRNA, LNP-S, PF, 30 MCG/0.3 ML DOSE VACCINE: CPT | Mod: PBBFAC | Performed by: FAMILY MEDICINE

## 2021-01-28 ENCOUNTER — TELEPHONE (OUTPATIENT)
Dept: ADMINISTRATIVE | Facility: CLINIC | Age: 86
End: 2021-01-28

## 2021-01-29 ENCOUNTER — IMMUNIZATION (OUTPATIENT)
Dept: INTERNAL MEDICINE | Facility: CLINIC | Age: 86
End: 2021-01-29
Payer: MEDICARE

## 2021-01-29 DIAGNOSIS — Z23 NEED FOR VACCINATION: Primary | ICD-10-CM

## 2021-01-29 PROCEDURE — 0002A COVID-19, MRNA, LNP-S, PF, 30 MCG/0.3 ML DOSE VACCINE: CPT | Mod: PBBFAC | Performed by: FAMILY MEDICINE

## 2021-01-29 PROCEDURE — 91300 COVID-19, MRNA, LNP-S, PF, 30 MCG/0.3 ML DOSE VACCINE: CPT | Mod: PBBFAC | Performed by: FAMILY MEDICINE

## 2021-04-30 ENCOUNTER — EXTERNAL CHRONIC CARE MANAGEMENT (OUTPATIENT)
Dept: PRIMARY CARE CLINIC | Facility: CLINIC | Age: 86
End: 2021-04-30
Payer: MEDICARE

## 2021-04-30 PROCEDURE — 99439 PR CHRONIC CARE MGMT, EA ADDTL 20 MIN: ICD-10-PCS | Mod: S$PBB,,, | Performed by: FAMILY MEDICINE

## 2021-04-30 PROCEDURE — 99490 CHRNC CARE MGMT STAFF 1ST 20: CPT | Mod: S$PBB,,, | Performed by: FAMILY MEDICINE

## 2021-04-30 PROCEDURE — 99490 PR CHRONIC CARE MGMT, 1ST 20 MIN: ICD-10-PCS | Mod: S$PBB,,, | Performed by: FAMILY MEDICINE

## 2021-04-30 PROCEDURE — 99439 CHRNC CARE MGMT STAF EA ADDL: CPT | Mod: PBBFAC,PO | Performed by: FAMILY MEDICINE

## 2021-04-30 PROCEDURE — 99439 CHRNC CARE MGMT STAF EA ADDL: CPT | Mod: S$PBB,,, | Performed by: FAMILY MEDICINE

## 2021-04-30 PROCEDURE — 99490 CHRNC CARE MGMT STAFF 1ST 20: CPT | Mod: PBBFAC,PO | Performed by: FAMILY MEDICINE

## 2021-05-24 ENCOUNTER — OFFICE VISIT (OUTPATIENT)
Dept: INTERNAL MEDICINE | Facility: CLINIC | Age: 86
End: 2021-05-24
Payer: MEDICARE

## 2021-05-24 ENCOUNTER — LAB VISIT (OUTPATIENT)
Dept: LAB | Facility: HOSPITAL | Age: 86
End: 2021-05-24
Attending: FAMILY MEDICINE
Payer: MEDICARE

## 2021-05-24 VITALS
TEMPERATURE: 98 F | WEIGHT: 184.06 LBS | SYSTOLIC BLOOD PRESSURE: 120 MMHG | OXYGEN SATURATION: 95 % | DIASTOLIC BLOOD PRESSURE: 80 MMHG | HEART RATE: 82 BPM | BODY MASS INDEX: 29.58 KG/M2 | HEIGHT: 66 IN

## 2021-05-24 DIAGNOSIS — R53.83 FATIGUE, UNSPECIFIED TYPE: ICD-10-CM

## 2021-05-24 DIAGNOSIS — G47.62 NOCTURNAL LEG CRAMPS: ICD-10-CM

## 2021-05-24 DIAGNOSIS — N18.30 STAGE 3 CHRONIC KIDNEY DISEASE, UNSPECIFIED WHETHER STAGE 3A OR 3B CKD: ICD-10-CM

## 2021-05-24 DIAGNOSIS — I10 ESSENTIAL HYPERTENSION: Primary | ICD-10-CM

## 2021-05-24 DIAGNOSIS — I10 ESSENTIAL HYPERTENSION: ICD-10-CM

## 2021-05-24 LAB
BASOPHILS # BLD AUTO: 0.06 K/UL (ref 0–0.2)
BASOPHILS NFR BLD: 0.9 % (ref 0–1.9)
DIFFERENTIAL METHOD: ABNORMAL
EOSINOPHIL # BLD AUTO: 0.1 K/UL (ref 0–0.5)
EOSINOPHIL NFR BLD: 2.1 % (ref 0–8)
ERYTHROCYTE [DISTWIDTH] IN BLOOD BY AUTOMATED COUNT: 14.7 % (ref 11.5–14.5)
HCT VFR BLD AUTO: 43.3 % (ref 37–48.5)
HGB BLD-MCNC: 13.5 G/DL (ref 12–16)
IMM GRANULOCYTES # BLD AUTO: 0.01 K/UL (ref 0–0.04)
IMM GRANULOCYTES NFR BLD AUTO: 0.1 % (ref 0–0.5)
LYMPHOCYTES # BLD AUTO: 1.6 K/UL (ref 1–4.8)
LYMPHOCYTES NFR BLD: 23.5 % (ref 18–48)
MCH RBC QN AUTO: 30.1 PG (ref 27–31)
MCHC RBC AUTO-ENTMCNC: 31.2 G/DL (ref 32–36)
MCV RBC AUTO: 97 FL (ref 82–98)
MONOCYTES # BLD AUTO: 0.5 K/UL (ref 0.3–1)
MONOCYTES NFR BLD: 6.9 % (ref 4–15)
NEUTROPHILS # BLD AUTO: 4.4 K/UL (ref 1.8–7.7)
NEUTROPHILS NFR BLD: 66.5 % (ref 38–73)
NRBC BLD-RTO: 0 /100 WBC
PLATELET # BLD AUTO: 200 K/UL (ref 150–450)
PMV BLD AUTO: 11.1 FL (ref 9.2–12.9)
RBC # BLD AUTO: 4.48 M/UL (ref 4–5.4)
WBC # BLD AUTO: 6.68 K/UL (ref 3.9–12.7)

## 2021-05-24 PROCEDURE — 99214 PR OFFICE/OUTPT VISIT, EST, LEVL IV, 30-39 MIN: ICD-10-PCS | Mod: S$PBB,,, | Performed by: FAMILY MEDICINE

## 2021-05-24 PROCEDURE — 36415 COLL VENOUS BLD VENIPUNCTURE: CPT | Performed by: FAMILY MEDICINE

## 2021-05-24 PROCEDURE — 99213 OFFICE O/P EST LOW 20 MIN: CPT | Mod: PBBFAC | Performed by: FAMILY MEDICINE

## 2021-05-24 PROCEDURE — 80053 COMPREHEN METABOLIC PANEL: CPT | Performed by: FAMILY MEDICINE

## 2021-05-24 PROCEDURE — 80061 LIPID PANEL: CPT | Performed by: FAMILY MEDICINE

## 2021-05-24 PROCEDURE — 99214 OFFICE O/P EST MOD 30 MIN: CPT | Mod: S$PBB,,, | Performed by: FAMILY MEDICINE

## 2021-05-24 PROCEDURE — 84443 ASSAY THYROID STIM HORMONE: CPT | Performed by: FAMILY MEDICINE

## 2021-05-24 PROCEDURE — 99999 PR PBB SHADOW E&M-EST. PATIENT-LVL III: ICD-10-PCS | Mod: PBBFAC,,, | Performed by: FAMILY MEDICINE

## 2021-05-24 PROCEDURE — 85025 COMPLETE CBC W/AUTO DIFF WBC: CPT | Performed by: FAMILY MEDICINE

## 2021-05-24 PROCEDURE — 99999 PR PBB SHADOW E&M-EST. PATIENT-LVL III: CPT | Mod: PBBFAC,,, | Performed by: FAMILY MEDICINE

## 2021-05-24 RX ORDER — PRAMIPEXOLE DIHYDROCHLORIDE 0.12 MG/1
0.12 TABLET ORAL NIGHTLY
Qty: 30 TABLET | Refills: 5 | Status: SHIPPED | OUTPATIENT
Start: 2021-05-24 | End: 2021-12-01

## 2021-05-25 ENCOUNTER — TELEPHONE (OUTPATIENT)
Dept: INTERNAL MEDICINE | Facility: CLINIC | Age: 86
End: 2021-05-25

## 2021-05-25 LAB
ALBUMIN SERPL BCP-MCNC: 3.9 G/DL (ref 3.5–5.2)
ALP SERPL-CCNC: 55 U/L (ref 55–135)
ALT SERPL W/O P-5'-P-CCNC: 14 U/L (ref 10–44)
ANION GAP SERPL CALC-SCNC: 9 MMOL/L (ref 8–16)
AST SERPL-CCNC: 26 U/L (ref 10–40)
BILIRUB SERPL-MCNC: 0.4 MG/DL (ref 0.1–1)
BUN SERPL-MCNC: 18 MG/DL (ref 10–30)
CALCIUM SERPL-MCNC: 9.8 MG/DL (ref 8.7–10.5)
CHLORIDE SERPL-SCNC: 110 MMOL/L (ref 95–110)
CHOLEST SERPL-MCNC: 178 MG/DL (ref 120–199)
CHOLEST/HDLC SERPL: 2.1 {RATIO} (ref 2–5)
CO2 SERPL-SCNC: 23 MMOL/L (ref 23–29)
CREAT SERPL-MCNC: 1 MG/DL (ref 0.5–1.4)
EST. GFR  (AFRICAN AMERICAN): 56.5 ML/MIN/1.73 M^2
EST. GFR  (NON AFRICAN AMERICAN): 49 ML/MIN/1.73 M^2
GLUCOSE SERPL-MCNC: 88 MG/DL (ref 70–110)
HDLC SERPL-MCNC: 83 MG/DL (ref 40–75)
HDLC SERPL: 46.6 % (ref 20–50)
LDLC SERPL CALC-MCNC: 82.6 MG/DL (ref 63–159)
NONHDLC SERPL-MCNC: 95 MG/DL
POTASSIUM SERPL-SCNC: 4.7 MMOL/L (ref 3.5–5.1)
PROT SERPL-MCNC: 7.1 G/DL (ref 6–8.4)
SODIUM SERPL-SCNC: 142 MMOL/L (ref 136–145)
TRIGL SERPL-MCNC: 62 MG/DL (ref 30–150)
TSH SERPL DL<=0.005 MIU/L-ACNC: 3.55 UIU/ML (ref 0.4–4)

## 2021-07-26 ENCOUNTER — TELEPHONE (OUTPATIENT)
Dept: INTERNAL MEDICINE | Facility: CLINIC | Age: 86
End: 2021-07-26

## 2021-07-27 ENCOUNTER — TELEPHONE (OUTPATIENT)
Dept: INTERNAL MEDICINE | Facility: CLINIC | Age: 86
End: 2021-07-27

## 2021-07-27 RX ORDER — BENZONATATE 200 MG/1
200 CAPSULE ORAL 3 TIMES DAILY PRN
Qty: 30 CAPSULE | Refills: 0 | Status: SHIPPED | OUTPATIENT
Start: 2021-07-27 | End: 2021-08-06

## 2021-07-28 ENCOUNTER — TELEPHONE (OUTPATIENT)
Dept: PRIMARY CARE CLINIC | Facility: CLINIC | Age: 86
End: 2021-07-28

## 2021-10-18 RX ORDER — LISINOPRIL 10 MG/1
TABLET ORAL
Qty: 90 TABLET | Refills: 2 | Status: SHIPPED | OUTPATIENT
Start: 2021-10-18 | End: 2021-12-01

## 2021-12-01 ENCOUNTER — HOSPITAL ENCOUNTER (OUTPATIENT)
Dept: RADIOLOGY | Facility: HOSPITAL | Age: 86
Discharge: HOME | End: 2021-12-01
Attending: FAMILY MEDICINE
Payer: MEDICARE

## 2021-12-01 ENCOUNTER — OFFICE VISIT (OUTPATIENT)
Dept: INTERNAL MEDICINE | Facility: CLINIC | Age: 86
End: 2021-12-01
Payer: MEDICARE

## 2021-12-01 VITALS
HEIGHT: 66 IN | WEIGHT: 181.88 LBS | OXYGEN SATURATION: 97 % | TEMPERATURE: 97 F | SYSTOLIC BLOOD PRESSURE: 118 MMHG | BODY MASS INDEX: 29.23 KG/M2 | HEART RATE: 91 BPM | DIASTOLIC BLOOD PRESSURE: 86 MMHG

## 2021-12-01 DIAGNOSIS — R79.89 ABNORMAL TSH: ICD-10-CM

## 2021-12-01 DIAGNOSIS — R94.6 ABNORMAL RESULTS OF THYROID FUNCTION STUDIES: ICD-10-CM

## 2021-12-01 DIAGNOSIS — N18.30 STAGE 3 CHRONIC KIDNEY DISEASE, UNSPECIFIED WHETHER STAGE 3A OR 3B CKD: ICD-10-CM

## 2021-12-01 DIAGNOSIS — R05.9 COUGH: ICD-10-CM

## 2021-12-01 DIAGNOSIS — R53.83 FATIGUE, UNSPECIFIED TYPE: ICD-10-CM

## 2021-12-01 DIAGNOSIS — I10 ESSENTIAL HYPERTENSION: Primary | ICD-10-CM

## 2021-12-01 DIAGNOSIS — R19.7 DIARRHEA, UNSPECIFIED TYPE: ICD-10-CM

## 2021-12-01 DIAGNOSIS — I77.1 TORTUOUS AORTA: ICD-10-CM

## 2021-12-01 PROCEDURE — 99214 OFFICE O/P EST MOD 30 MIN: CPT | Mod: S$PBB,,, | Performed by: FAMILY MEDICINE

## 2021-12-01 PROCEDURE — 71046 XR CHEST PA AND LATERAL: ICD-10-PCS | Mod: 26,,, | Performed by: RADIOLOGY

## 2021-12-01 PROCEDURE — 99999 PR PBB SHADOW E&M-EST. PATIENT-LVL III: CPT | Mod: PBBFAC,,, | Performed by: FAMILY MEDICINE

## 2021-12-01 PROCEDURE — 99214 PR OFFICE/OUTPT VISIT, EST, LEVL IV, 30-39 MIN: ICD-10-PCS | Mod: S$PBB,,, | Performed by: FAMILY MEDICINE

## 2021-12-01 PROCEDURE — 99999 PR PBB SHADOW E&M-EST. PATIENT-LVL III: ICD-10-PCS | Mod: PBBFAC,,, | Performed by: FAMILY MEDICINE

## 2021-12-01 PROCEDURE — 71046 X-RAY EXAM CHEST 2 VIEWS: CPT | Mod: TC

## 2021-12-01 PROCEDURE — 71046 X-RAY EXAM CHEST 2 VIEWS: CPT | Mod: 26,,, | Performed by: RADIOLOGY

## 2021-12-01 PROCEDURE — 99213 OFFICE O/P EST LOW 20 MIN: CPT | Mod: PBBFAC,25 | Performed by: FAMILY MEDICINE

## 2021-12-01 RX ORDER — LOSARTAN POTASSIUM 25 MG/1
25 TABLET ORAL DAILY
Qty: 90 TABLET | Refills: 3 | Status: SHIPPED | OUTPATIENT
Start: 2021-12-01 | End: 2022-12-12

## 2022-01-27 ENCOUNTER — TELEPHONE (OUTPATIENT)
Dept: ADMINISTRATIVE | Facility: HOSPITAL | Age: 87
End: 2022-01-27
Payer: MEDICARE

## 2022-03-28 ENCOUNTER — TELEPHONE (OUTPATIENT)
Dept: ADMINISTRATIVE | Facility: HOSPITAL | Age: 87
End: 2022-03-28
Payer: MEDICARE

## 2022-04-26 ENCOUNTER — PES CALL (OUTPATIENT)
Dept: ADMINISTRATIVE | Facility: CLINIC | Age: 87
End: 2022-04-26
Payer: MEDICARE

## 2022-06-01 ENCOUNTER — LAB VISIT (OUTPATIENT)
Dept: LAB | Facility: HOSPITAL | Age: 87
End: 2022-06-01
Attending: FAMILY MEDICINE
Payer: MEDICARE

## 2022-06-01 DIAGNOSIS — R53.83 FATIGUE, UNSPECIFIED TYPE: Primary | ICD-10-CM

## 2022-06-01 DIAGNOSIS — R53.83 FATIGUE, UNSPECIFIED TYPE: ICD-10-CM

## 2022-06-01 LAB
ALBUMIN SERPL BCP-MCNC: 3.9 G/DL (ref 3.5–5.2)
ALP SERPL-CCNC: 53 U/L (ref 55–135)
ALT SERPL W/O P-5'-P-CCNC: 8 U/L (ref 10–44)
ANION GAP SERPL CALC-SCNC: 11 MMOL/L (ref 8–16)
AST SERPL-CCNC: 23 U/L (ref 10–40)
BASOPHILS # BLD AUTO: 0.07 K/UL (ref 0–0.2)
BASOPHILS NFR BLD: 1.1 % (ref 0–1.9)
BILIRUB SERPL-MCNC: 0.5 MG/DL (ref 0.1–1)
BUN SERPL-MCNC: 17 MG/DL (ref 10–30)
CALCIUM SERPL-MCNC: 9.6 MG/DL (ref 8.7–10.5)
CHLORIDE SERPL-SCNC: 106 MMOL/L (ref 95–110)
CO2 SERPL-SCNC: 24 MMOL/L (ref 23–29)
CREAT SERPL-MCNC: 1.1 MG/DL (ref 0.5–1.4)
DIFFERENTIAL METHOD: ABNORMAL
EOSINOPHIL # BLD AUTO: 0.1 K/UL (ref 0–0.5)
EOSINOPHIL NFR BLD: 2.2 % (ref 0–8)
ERYTHROCYTE [DISTWIDTH] IN BLOOD BY AUTOMATED COUNT: 13.8 % (ref 11.5–14.5)
EST. GFR  (AFRICAN AMERICAN): 50 ML/MIN/1.73 M^2
EST. GFR  (NON AFRICAN AMERICAN): 43.4 ML/MIN/1.73 M^2
GLUCOSE SERPL-MCNC: 96 MG/DL (ref 70–110)
HCT VFR BLD AUTO: 43.1 % (ref 37–48.5)
HGB BLD-MCNC: 13.6 G/DL (ref 12–16)
IMM GRANULOCYTES # BLD AUTO: 0.02 K/UL (ref 0–0.04)
IMM GRANULOCYTES NFR BLD AUTO: 0.3 % (ref 0–0.5)
LYMPHOCYTES # BLD AUTO: 1.6 K/UL (ref 1–4.8)
LYMPHOCYTES NFR BLD: 25.7 % (ref 18–48)
MCH RBC QN AUTO: 30.2 PG (ref 27–31)
MCHC RBC AUTO-ENTMCNC: 31.6 G/DL (ref 32–36)
MCV RBC AUTO: 96 FL (ref 82–98)
MONOCYTES # BLD AUTO: 0.5 K/UL (ref 0.3–1)
MONOCYTES NFR BLD: 7.1 % (ref 4–15)
NEUTROPHILS # BLD AUTO: 4.1 K/UL (ref 1.8–7.7)
NEUTROPHILS NFR BLD: 63.6 % (ref 38–73)
NRBC BLD-RTO: 0 /100 WBC
PLATELET # BLD AUTO: 218 K/UL (ref 150–450)
PMV BLD AUTO: 10.8 FL (ref 9.2–12.9)
POTASSIUM SERPL-SCNC: 4.8 MMOL/L (ref 3.5–5.1)
PROT SERPL-MCNC: 6.9 G/DL (ref 6–8.4)
RBC # BLD AUTO: 4.51 M/UL (ref 4–5.4)
SODIUM SERPL-SCNC: 141 MMOL/L (ref 136–145)
T4 FREE SERPL-MCNC: 0.81 NG/DL (ref 0.71–1.51)
TSH SERPL DL<=0.005 MIU/L-ACNC: 3.74 UIU/ML (ref 0.4–4)
WBC # BLD AUTO: 6.38 K/UL (ref 3.9–12.7)

## 2022-06-01 PROCEDURE — 85025 COMPLETE CBC W/AUTO DIFF WBC: CPT | Performed by: FAMILY MEDICINE

## 2022-06-01 PROCEDURE — 80053 COMPREHEN METABOLIC PANEL: CPT | Performed by: FAMILY MEDICINE

## 2022-06-01 PROCEDURE — 84443 ASSAY THYROID STIM HORMONE: CPT | Performed by: FAMILY MEDICINE

## 2022-06-01 PROCEDURE — 36415 COLL VENOUS BLD VENIPUNCTURE: CPT | Performed by: FAMILY MEDICINE

## 2022-06-01 PROCEDURE — 84439 ASSAY OF FREE THYROXINE: CPT | Performed by: FAMILY MEDICINE

## 2022-10-18 ENCOUNTER — TELEPHONE (OUTPATIENT)
Dept: ADMINISTRATIVE | Facility: HOSPITAL | Age: 87
End: 2022-10-18
Payer: MEDICARE

## 2022-11-30 ENCOUNTER — TELEPHONE (OUTPATIENT)
Dept: INTERNAL MEDICINE | Facility: CLINIC | Age: 87
End: 2022-11-30
Payer: MEDICARE

## 2022-12-07 ENCOUNTER — OFFICE VISIT (OUTPATIENT)
Dept: INTERNAL MEDICINE | Facility: CLINIC | Age: 87
End: 2022-12-07
Payer: MEDICARE

## 2022-12-07 ENCOUNTER — TELEPHONE (OUTPATIENT)
Dept: INTERNAL MEDICINE | Facility: CLINIC | Age: 87
End: 2022-12-07
Payer: MEDICARE

## 2022-12-07 ENCOUNTER — PATIENT MESSAGE (OUTPATIENT)
Dept: INTERNAL MEDICINE | Facility: CLINIC | Age: 87
End: 2022-12-07

## 2022-12-07 DIAGNOSIS — G47.62 NOCTURNAL LEG CRAMPS: Primary | ICD-10-CM

## 2022-12-07 PROCEDURE — 99214 PR OFFICE/OUTPT VISIT, EST, LEVL IV, 30-39 MIN: ICD-10-PCS | Mod: 95,,, | Performed by: FAMILY MEDICINE

## 2022-12-07 PROCEDURE — 99214 OFFICE O/P EST MOD 30 MIN: CPT | Mod: 95,,, | Performed by: FAMILY MEDICINE

## 2022-12-07 RX ORDER — PRAMIPEXOLE DIHYDROCHLORIDE 0.25 MG/1
0.25 TABLET ORAL NIGHTLY
Qty: 30 TABLET | Refills: 11 | Status: CANCELLED | OUTPATIENT
Start: 2022-12-07 | End: 2023-12-07

## 2022-12-07 RX ORDER — PRAMIPEXOLE DIHYDROCHLORIDE 0.25 MG/1
0.25 TABLET ORAL NIGHTLY
Qty: 30 TABLET | Refills: 5 | Status: SHIPPED | OUTPATIENT
Start: 2022-12-07 | End: 2023-05-30

## 2022-12-07 NOTE — TELEPHONE ENCOUNTER
Returned call and spoke with Francisca Virtual appointment discussed patient unable to log in. Dr Serrano informed.

## 2022-12-07 NOTE — PROGRESS NOTES
Subjective:       Patient ID: Francisca Pretty is a 89 y.o. female.    Chief Complaint: No chief complaint on file.    The patient location is:  Home  The chief complaint leading to consultation is:  Six-month follow-up nocturnal leg cramps    Visit type: audiovisual    Face to Face time with patient: 15 minutes of total time spent on the encounter, which includes face to face time and non-face to face time preparing to see the patient (eg, review of tests), Obtaining and/or reviewing separately obtained history, Documenting clinical information in the electronic or other health record, Independently interpreting results (not separately reported) and communicating results to the patient/family/caregiver, or Care coordination (not separately reported).         Each patient to whom he or she provides medical services by telemedicine is:  (1) informed of the relationship between the physician and patient and the respective role of any other health care provider with respect to management of the patient; and (2) notified that he or she may decline to receive medical services by telemedicine and may withdraw from such care at any time.    Notes:         Review of Systems   Constitutional:  Negative for activity change.   HENT:  Negative for hearing loss, rhinorrhea and trouble swallowing.    Eyes:  Negative for discharge and visual disturbance.   Respiratory:  Negative for chest tightness and wheezing.    Cardiovascular:  Negative for chest pain and palpitations.   Gastrointestinal:  Negative for blood in stool, constipation, diarrhea and vomiting.   Endocrine: Negative for polydipsia and polyuria.   Genitourinary:  Negative for difficulty urinating, dysuria, hematuria and menstrual problem.   Musculoskeletal:  Negative for arthralgias, joint swelling and neck pain.        Nocturnal leg cramps   Neurological:  Negative for weakness and headaches.   Psychiatric/Behavioral:  Negative for confusion and dysphoric mood.       Objective:      Physical Exam  Constitutional:       General: She is not in acute distress.     Appearance: She is not ill-appearing or diaphoretic.   Pulmonary:      Effort: Pulmonary effort is normal. No respiratory distress.      Breath sounds: No wheezing.   Skin:     General: Skin is warm and dry.      Coloration: Skin is not pale.      Findings: No erythema.   Neurological:      Mental Status: She is alert and oriented to person, place, and time.   Psychiatric:         Mood and Affect: Mood normal.         Behavior: Behavior normal.         Thought Content: Thought content normal.         Judgment: Judgment normal.       Lab Visit on 06/01/2022   Component Date Value Ref Range Status    WBC 06/01/2022 6.38  3.90 - 12.70 K/uL Final    RBC 06/01/2022 4.51  4.00 - 5.40 M/uL Final    Hemoglobin 06/01/2022 13.6  12.0 - 16.0 g/dL Final    Hematocrit 06/01/2022 43.1  37.0 - 48.5 % Final    MCV 06/01/2022 96  82 - 98 fL Final    MCH 06/01/2022 30.2  27.0 - 31.0 pg Final    MCHC 06/01/2022 31.6 (L)  32.0 - 36.0 g/dL Final    RDW 06/01/2022 13.8  11.5 - 14.5 % Final    Platelets 06/01/2022 218  150 - 450 K/uL Final    MPV 06/01/2022 10.8  9.2 - 12.9 fL Final    Immature Granulocytes 06/01/2022 0.3  0.0 - 0.5 % Final    Gran # (ANC) 06/01/2022 4.1  1.8 - 7.7 K/uL Final    Immature Grans (Abs) 06/01/2022 0.02  0.00 - 0.04 K/uL Final    Lymph # 06/01/2022 1.6  1.0 - 4.8 K/uL Final    Mono # 06/01/2022 0.5  0.3 - 1.0 K/uL Final    Eos # 06/01/2022 0.1  0.0 - 0.5 K/uL Final    Baso # 06/01/2022 0.07  0.00 - 0.20 K/uL Final    nRBC 06/01/2022 0  0 /100 WBC Final    Gran % 06/01/2022 63.6  38.0 - 73.0 % Final    Lymph % 06/01/2022 25.7  18.0 - 48.0 % Final    Mono % 06/01/2022 7.1  4.0 - 15.0 % Final    Eosinophil % 06/01/2022 2.2  0.0 - 8.0 % Final    Basophil % 06/01/2022 1.1  0.0 - 1.9 % Final    Differential Method 06/01/2022 Automated   Final    Sodium 06/01/2022 141  136 - 145 mmol/L Final    Potassium 06/01/2022  4.8  3.5 - 5.1 mmol/L Final    Chloride 06/01/2022 106  95 - 110 mmol/L Final    CO2 06/01/2022 24  23 - 29 mmol/L Final    Glucose 06/01/2022 96  70 - 110 mg/dL Final    BUN 06/01/2022 17  10 - 30 mg/dL Final    Creatinine 06/01/2022 1.1  0.5 - 1.4 mg/dL Final    Calcium 06/01/2022 9.6  8.7 - 10.5 mg/dL Final    Total Protein 06/01/2022 6.9  6.0 - 8.4 g/dL Final    Albumin 06/01/2022 3.9  3.5 - 5.2 g/dL Final    Total Bilirubin 06/01/2022 0.5  0.1 - 1.0 mg/dL Final    Alkaline Phosphatase 06/01/2022 53 (L)  55 - 135 U/L Final    AST 06/01/2022 23  10 - 40 U/L Final    ALT 06/01/2022 8 (L)  10 - 44 U/L Final    Anion Gap 06/01/2022 11  8 - 16 mmol/L Final    eGFR if African American 06/01/2022 50.0 (A)  >60 mL/min/1.73 m^2 Final    eGFR if non African American 06/01/2022 43.4 (A)  >60 mL/min/1.73 m^2 Final    TSH 06/01/2022 3.738  0.400 - 4.000 uIU/mL Final    Free T4 06/01/2022 0.81  0.71 - 1.51 ng/dL Final     Assessment:       No diagnosis found.    Plan:   Overall she remained stable.  She does have some fatigue.  She is taking care of her 100+ year old .  She is having some leg cramps would like medication for this.  Medications reviewed follow-up in 6 months      There are no diagnoses linked to this encounter.

## 2023-05-05 ENCOUNTER — TELEPHONE (OUTPATIENT)
Dept: INTERNAL MEDICINE | Facility: CLINIC | Age: 88
End: 2023-05-05
Payer: MEDICARE

## 2023-05-05 NOTE — TELEPHONE ENCOUNTER
----- Message from Lynne Rodgers sent at 5/5/2023 11:37 AM CDT -----  Regarding: please advise  Who Called:ALFONSO MIDDLETON [[id number]]         What is the reqeust in detail: Requesting call back to discuss blood pressure is very high. Please advise         Can the clinic reply by [facility]?no         Best Call Back Number:[phone]           Additional Information:

## 2023-05-05 NOTE — TELEPHONE ENCOUNTER
Called and spoke with patient. Scheduled patient an appointment on Monday regarding her blood pressure

## 2023-05-08 ENCOUNTER — TELEPHONE (OUTPATIENT)
Dept: INTERNAL MEDICINE | Facility: CLINIC | Age: 88
End: 2023-05-08
Payer: MEDICARE

## 2023-05-08 NOTE — TELEPHONE ENCOUNTER
Patient stated she went to get tooth pulled and Dentist refused d/t BP being to high 174/100. Took it today and 189/96.  Appointment suggested but patient requested message be sent first. Patient stated she is to return to Dentist on Wednesday.

## 2023-05-08 NOTE — TELEPHONE ENCOUNTER
Returned call to patient informed per Dr Serrano to Increase Cozaar 25 mg and take 2 each morning. Patient voiced understanding.

## 2023-05-08 NOTE — TELEPHONE ENCOUNTER
----- Message from Raine Aceves sent at 5/8/2023  9:49 AM CDT -----  Contact: Brayan, [phone]  Calling to speak with the nurse regarding blood pressure issues. Please call her. Thanks.

## 2023-05-30 ENCOUNTER — HOSPITAL ENCOUNTER (OUTPATIENT)
Dept: CARDIOLOGY | Facility: HOSPITAL | Age: 88
Discharge: HOME | End: 2023-05-30
Attending: FAMILY MEDICINE
Payer: MEDICARE

## 2023-05-30 ENCOUNTER — HOSPITAL ENCOUNTER (OUTPATIENT)
Dept: RADIOLOGY | Facility: HOSPITAL | Age: 88
Discharge: HOME | End: 2023-05-30
Attending: FAMILY MEDICINE
Payer: MEDICARE

## 2023-05-30 ENCOUNTER — OFFICE VISIT (OUTPATIENT)
Dept: INTERNAL MEDICINE | Facility: CLINIC | Age: 88
End: 2023-05-30
Payer: MEDICARE

## 2023-05-30 VITALS
BODY MASS INDEX: 28.77 KG/M2 | SYSTOLIC BLOOD PRESSURE: 124 MMHG | HEIGHT: 66 IN | DIASTOLIC BLOOD PRESSURE: 80 MMHG | OXYGEN SATURATION: 96 % | WEIGHT: 179 LBS | HEART RATE: 84 BPM | TEMPERATURE: 98 F

## 2023-05-30 DIAGNOSIS — I77.1 TORTUOUS AORTA: ICD-10-CM

## 2023-05-30 DIAGNOSIS — Z00.00 ANNUAL PHYSICAL EXAM: Primary | ICD-10-CM

## 2023-05-30 DIAGNOSIS — R19.7 DIARRHEA, UNSPECIFIED TYPE: ICD-10-CM

## 2023-05-30 DIAGNOSIS — R05.9 COUGH, UNSPECIFIED TYPE: ICD-10-CM

## 2023-05-30 DIAGNOSIS — N18.30 STAGE 3 CHRONIC KIDNEY DISEASE, UNSPECIFIED WHETHER STAGE 3A OR 3B CKD: ICD-10-CM

## 2023-05-30 DIAGNOSIS — E55.9 VITAMIN D DEFICIENCY DISEASE: ICD-10-CM

## 2023-05-30 DIAGNOSIS — I10 ESSENTIAL HYPERTENSION: ICD-10-CM

## 2023-05-30 DIAGNOSIS — R79.89 ABNORMAL TSH: ICD-10-CM

## 2023-05-30 DIAGNOSIS — R94.6 ABNORMAL RESULTS OF THYROID FUNCTION STUDIES: ICD-10-CM

## 2023-05-30 PROBLEM — M25.512 ACUTE PAIN OF LEFT SHOULDER: Status: RESOLVED | Noted: 2017-09-11 | Resolved: 2023-05-30

## 2023-05-30 PROBLEM — R03.0 ELEVATED BLOOD PRESSURE READING: Status: RESOLVED | Noted: 2017-06-26 | Resolved: 2023-05-30

## 2023-05-30 PROBLEM — Z01.818 PREOPERATIVE CLEARANCE: Status: RESOLVED | Noted: 2019-10-02 | Resolved: 2023-05-30

## 2023-05-30 PROCEDURE — 99214 PR OFFICE/OUTPT VISIT, EST, LEVL IV, 30-39 MIN: ICD-10-PCS | Mod: S$PBB,,, | Performed by: FAMILY MEDICINE

## 2023-05-30 PROCEDURE — 71046 X-RAY EXAM CHEST 2 VIEWS: CPT | Mod: TC

## 2023-05-30 PROCEDURE — 99213 OFFICE O/P EST LOW 20 MIN: CPT | Mod: PBBFAC,25 | Performed by: FAMILY MEDICINE

## 2023-05-30 PROCEDURE — 99999 PR PBB SHADOW E&M-EST. PATIENT-LVL III: CPT | Mod: PBBFAC,,, | Performed by: FAMILY MEDICINE

## 2023-05-30 PROCEDURE — 99214 OFFICE O/P EST MOD 30 MIN: CPT | Mod: S$PBB,,, | Performed by: FAMILY MEDICINE

## 2023-05-30 PROCEDURE — 90677 PCV20 VACCINE IM: CPT | Mod: PBBFAC

## 2023-05-30 PROCEDURE — 71046 X-RAY EXAM CHEST 2 VIEWS: CPT | Mod: 26,,, | Performed by: RADIOLOGY

## 2023-05-30 PROCEDURE — 93010 ELECTROCARDIOGRAM REPORT: CPT | Mod: ,,, | Performed by: INTERNAL MEDICINE

## 2023-05-30 PROCEDURE — 99999 PR PBB SHADOW E&M-EST. PATIENT-LVL III: ICD-10-PCS | Mod: PBBFAC,,, | Performed by: FAMILY MEDICINE

## 2023-05-30 PROCEDURE — 93005 ELECTROCARDIOGRAM TRACING: CPT

## 2023-05-30 PROCEDURE — 93010 EKG 12-LEAD: ICD-10-PCS | Mod: ,,, | Performed by: INTERNAL MEDICINE

## 2023-05-30 PROCEDURE — 71046 XR CHEST PA AND LATERAL: ICD-10-PCS | Mod: 26,,, | Performed by: RADIOLOGY

## 2023-05-30 RX ORDER — LOSARTAN POTASSIUM 50 MG/1
50 TABLET ORAL DAILY
Qty: 90 TABLET | Refills: 3 | Status: SHIPPED | OUTPATIENT
Start: 2023-05-30 | End: 2023-06-12 | Stop reason: SDUPTHER

## 2023-05-30 NOTE — PROGRESS NOTES
Subjective:       Patient ID: Francisca Pretty is a 89 y.o. female.    Chief Complaint: Annual Exam    Annual exam.  Follow-up hypertension chronic diarrhea subclinical hypothyroidism vitamin-D deficiency chronic renal disease fatigue tortuous aorta tremor.  She denies headache palpitations shortness breath edema.  She occasionally has chest pressure at rest but not with exertion.  She has chronic diarrhea mostly in the morning.  She is using Metamucil wafers which is not helping.  She has used Imodium in the past which has helped.  She denies any changes in appetite or weight loss.  Her weight 18 months ago was 181 lb and now 179 lb.  She has ongoing fatigue possibly related to age and taking care of her .  She is taking over-the-counter vitamin-D supplement.  She is concerned about elevated blood pressure at home.  Blood pressure today was 120/80 both arms    Review of Systems   Constitutional:  Positive for fatigue. Negative for activity change, appetite change, chills and fever.   HENT:  Negative for congestion.    Respiratory:  Positive for cough. Negative for chest tightness, shortness of breath and wheezing.         Two weeks duration of a dry cough no shortness of breath   Cardiovascular:  Negative for palpitations and leg swelling.        Occasional chest discomfort at rest.  Not positional.  Denies reflux.   Gastrointestinal:  Positive for diarrhea. Negative for abdominal distention, abdominal pain, constipation, nausea and vomiting.   Neurological:  Negative for dizziness, weakness, light-headedness and headaches.     Objective:      Physical Exam  Constitutional:       General: She is not in acute distress.     Appearance: She is normal weight. She is not ill-appearing or diaphoretic.   Cardiovascular:      Rate and Rhythm: Normal rate and regular rhythm.      Heart sounds: No murmur heard.    No gallop.   Pulmonary:      Effort: Pulmonary effort is normal. No respiratory distress.      Breath  sounds: No wheezing, rhonchi or rales.   Abdominal:      General: There is no distension.      Palpations: Abdomen is soft.      Tenderness: There is no abdominal tenderness.   Lymphadenopathy:      Cervical: No cervical adenopathy.   Skin:     General: Skin is warm and dry.      Coloration: Skin is not pale.      Findings: No erythema.   Neurological:      Mental Status: She is oriented to person, place, and time.   Psychiatric:         Mood and Affect: Mood normal.         Behavior: Behavior normal.         Thought Content: Thought content normal.         Judgment: Judgment normal.       Lab Visit on 06/01/2022   Component Date Value Ref Range Status    WBC 06/01/2022 6.38  3.90 - 12.70 K/uL Final    RBC 06/01/2022 4.51  4.00 - 5.40 M/uL Final    Hemoglobin 06/01/2022 13.6  12.0 - 16.0 g/dL Final    Hematocrit 06/01/2022 43.1  37.0 - 48.5 % Final    MCV 06/01/2022 96  82 - 98 fL Final    MCH 06/01/2022 30.2  27.0 - 31.0 pg Final    MCHC 06/01/2022 31.6 (L)  32.0 - 36.0 g/dL Final    RDW 06/01/2022 13.8  11.5 - 14.5 % Final    Platelets 06/01/2022 218  150 - 450 K/uL Final    MPV 06/01/2022 10.8  9.2 - 12.9 fL Final    Immature Granulocytes 06/01/2022 0.3  0.0 - 0.5 % Final    Gran # (ANC) 06/01/2022 4.1  1.8 - 7.7 K/uL Final    Immature Grans (Abs) 06/01/2022 0.02  0.00 - 0.04 K/uL Final    Lymph # 06/01/2022 1.6  1.0 - 4.8 K/uL Final    Mono # 06/01/2022 0.5  0.3 - 1.0 K/uL Final    Eos # 06/01/2022 0.1  0.0 - 0.5 K/uL Final    Baso # 06/01/2022 0.07  0.00 - 0.20 K/uL Final    nRBC 06/01/2022 0  0 /100 WBC Final    Gran % 06/01/2022 63.6  38.0 - 73.0 % Final    Lymph % 06/01/2022 25.7  18.0 - 48.0 % Final    Mono % 06/01/2022 7.1  4.0 - 15.0 % Final    Eosinophil % 06/01/2022 2.2  0.0 - 8.0 % Final    Basophil % 06/01/2022 1.1  0.0 - 1.9 % Final    Differential Method 06/01/2022 Automated   Final    Sodium 06/01/2022 141  136 - 145 mmol/L Final    Potassium 06/01/2022 4.8  3.5 - 5.1 mmol/L Final    Chloride  06/01/2022 106  95 - 110 mmol/L Final    CO2 06/01/2022 24  23 - 29 mmol/L Final    Glucose 06/01/2022 96  70 - 110 mg/dL Final    BUN 06/01/2022 17  10 - 30 mg/dL Final    Creatinine 06/01/2022 1.1  0.5 - 1.4 mg/dL Final    Calcium 06/01/2022 9.6  8.7 - 10.5 mg/dL Final    Total Protein 06/01/2022 6.9  6.0 - 8.4 g/dL Final    Albumin 06/01/2022 3.9  3.5 - 5.2 g/dL Final    Total Bilirubin 06/01/2022 0.5  0.1 - 1.0 mg/dL Final    Alkaline Phosphatase 06/01/2022 53 (L)  55 - 135 U/L Final    AST 06/01/2022 23  10 - 40 U/L Final    ALT 06/01/2022 8 (L)  10 - 44 U/L Final    Anion Gap 06/01/2022 11  8 - 16 mmol/L Final    eGFR if African American 06/01/2022 50.0 (A)  >60 mL/min/1.73 m^2 Final    eGFR if non African American 06/01/2022 43.4 (A)  >60 mL/min/1.73 m^2 Final    TSH 06/01/2022 3.738  0.400 - 4.000 uIU/mL Final    Free T4 06/01/2022 0.81  0.71 - 1.51 ng/dL Final     Assessment:       1. Annual physical exam    2. Tortuous aorta    3. Stage 3 chronic kidney disease, unspecified whether stage 3a or 3b CKD    4. Essential hypertension    5. Vitamin D deficiency disease    6. Cough, unspecified type    7. Abnormal TSH    8. Abnormal results of thyroid function studies    9. Diarrhea, unspecified type        Plan:   Blood pressure controlled continue losartan 50 mg a day.  Start Imodium 1 a day for diarrhea.  Follow-up 1 month and bring home blood pressure monitor and.  Lab was ordered EKG chest x-ray ordered pneumococcal 20 given she declined Shingrix       Annual physical exam    Tortuous aorta    Stage 3 chronic kidney disease, unspecified whether stage 3a or 3b CKD    Essential hypertension  -     losartan (COZAAR) 50 MG tablet; Take 1 tablet (50 mg total) by mouth once daily.  Dispense: 90 tablet; Refill: 3  -     CBC Auto Differential; Future; Expected date: 05/30/2023  -     Urinalysis; Future; Expected date: 05/30/2023  -     Comprehensive Metabolic Panel; Future; Expected date: 05/30/2023  -     Lipid  Panel; Future; Expected date: 05/30/2023  -     TSH; Future; Expected date: 05/30/2023  -     SCHEDULED EKG 12-LEAD (to Muse); Future    Vitamin D deficiency disease  -     Vitamin D; Future; Expected date: 05/30/2023    Cough, unspecified type  -     X-Ray Chest PA And Lateral; Future; Expected date: 05/30/2023    Abnormal TSH    Abnormal results of thyroid function studies    Diarrhea, unspecified type    Other orders  -     (In Office Administered) Pneumococcal Conjugate Vaccine (20 Valent) (IM)

## 2023-05-31 DIAGNOSIS — R94.6 ABNORMAL RESULTS OF THYROID FUNCTION STUDIES: ICD-10-CM

## 2023-05-31 DIAGNOSIS — R79.89 ABNORMAL TSH: Primary | ICD-10-CM

## 2023-06-12 DIAGNOSIS — I10 ESSENTIAL HYPERTENSION: ICD-10-CM

## 2023-06-12 NOTE — TELEPHONE ENCOUNTER
LOV 05/30/2023  Along with Cozaar patient also requesting pramipexole (MIRAPEX) 0.25 MG tablet  not seen on profile.

## 2023-06-12 NOTE — TELEPHONE ENCOUNTER
----- Message from Channing Cox sent at 6/12/2023 10:41 AM CDT -----  Contact: Pt  .Type:  Needs Medical Advice    Who Called: pt    Pharmacy name and phone #:  [id number] - [place] - [address]   Phone: [phone]  Fax:  [phone]  Would the patient rather a call back or a response via [facility]?  Call back  Best Call Back Number:[phone]   Additional Information: Pt. Needs a refill on her losartan (COZAAR) 50 MG tablet and pramipexole (MIRAPEX) 0.25 MG tablet

## 2023-06-12 NOTE — TELEPHONE ENCOUNTER
No care due was identified.  [facility] Embedded Care Due Messages. Reference number: [id number].   6/12/2023 2:45:01 PM CDT

## 2023-06-13 RX ORDER — LOSARTAN POTASSIUM 50 MG/1
50 TABLET ORAL DAILY
Qty: 90 TABLET | Refills: 3 | Status: SHIPPED | OUTPATIENT
Start: 2023-06-13 | End: 2023-11-14

## 2023-06-23 DIAGNOSIS — G47.62 NOCTURNAL LEG CRAMPS: Primary | ICD-10-CM

## 2023-06-23 RX ORDER — PRAMIPEXOLE DIHYDROCHLORIDE 0.25 MG/1
0.25 TABLET ORAL NIGHTLY
Qty: 90 TABLET | Refills: 1 | OUTPATIENT
Start: 2023-06-23 | End: 2024-06-22

## 2023-06-23 RX ORDER — PRAMIPEXOLE DIHYDROCHLORIDE 0.25 MG/1
0.25 TABLET ORAL NIGHTLY
Qty: 30 TABLET | Refills: 5 | Status: SHIPPED | OUTPATIENT
Start: 2023-06-23 | End: 2023-12-22

## 2023-07-12 ENCOUNTER — LAB VISIT (OUTPATIENT)
Dept: LAB | Facility: HOSPITAL | Age: 88
End: 2023-07-12
Attending: FAMILY MEDICINE
Payer: MEDICARE

## 2023-07-12 DIAGNOSIS — R79.89 ABNORMAL TSH: ICD-10-CM

## 2023-07-12 DIAGNOSIS — R94.6 ABNORMAL RESULTS OF THYROID FUNCTION STUDIES: ICD-10-CM

## 2023-07-12 LAB
T4 FREE SERPL-MCNC: 0.86 NG/DL (ref 0.71–1.51)
TSH SERPL DL<=0.005 MIU/L-ACNC: 3.5 UIU/ML (ref 0.4–4)

## 2023-07-12 PROCEDURE — 36415 COLL VENOUS BLD VENIPUNCTURE: CPT | Performed by: FAMILY MEDICINE

## 2023-07-12 PROCEDURE — 84443 ASSAY THYROID STIM HORMONE: CPT | Performed by: FAMILY MEDICINE

## 2023-07-12 PROCEDURE — 84439 ASSAY OF FREE THYROXINE: CPT | Performed by: FAMILY MEDICINE

## 2023-08-11 ENCOUNTER — PES CALL (OUTPATIENT)
Dept: ADMINISTRATIVE | Facility: CLINIC | Age: 88
End: 2023-08-11
Payer: MEDICARE

## 2023-09-04 PROBLEM — Z00.00 ANNUAL PHYSICAL EXAM: Status: RESOLVED | Noted: 2017-06-26 | Resolved: 2023-09-04

## 2023-11-07 ENCOUNTER — TELEPHONE (OUTPATIENT)
Dept: INTERNAL MEDICINE | Facility: CLINIC | Age: 88
End: 2023-11-07
Payer: MEDICARE

## 2023-11-14 ENCOUNTER — OFFICE VISIT (OUTPATIENT)
Dept: INTERNAL MEDICINE | Facility: CLINIC | Age: 88
End: 2023-11-14
Payer: MEDICARE

## 2023-11-14 VITALS
SYSTOLIC BLOOD PRESSURE: 140 MMHG | TEMPERATURE: 98 F | WEIGHT: 189.81 LBS | HEART RATE: 87 BPM | RESPIRATION RATE: 18 BRPM | DIASTOLIC BLOOD PRESSURE: 90 MMHG | BODY MASS INDEX: 30.51 KG/M2 | OXYGEN SATURATION: 99 % | HEIGHT: 66 IN

## 2023-11-14 DIAGNOSIS — I10 ESSENTIAL HYPERTENSION: Primary | ICD-10-CM

## 2023-11-14 PROCEDURE — 99999 PR PBB SHADOW E&M-EST. PATIENT-LVL IV: CPT | Mod: PBBFAC,,, | Performed by: PHYSICIAN ASSISTANT

## 2023-11-14 PROCEDURE — 99214 PR OFFICE/OUTPT VISIT, EST, LEVL IV, 30-39 MIN: ICD-10-PCS | Mod: S$PBB,,, | Performed by: PHYSICIAN ASSISTANT

## 2023-11-14 PROCEDURE — 99214 OFFICE O/P EST MOD 30 MIN: CPT | Mod: PBBFAC | Performed by: PHYSICIAN ASSISTANT

## 2023-11-14 PROCEDURE — 99999 PR PBB SHADOW E&M-EST. PATIENT-LVL IV: ICD-10-PCS | Mod: PBBFAC,,, | Performed by: PHYSICIAN ASSISTANT

## 2023-11-14 PROCEDURE — 99214 OFFICE O/P EST MOD 30 MIN: CPT | Mod: S$PBB,,, | Performed by: PHYSICIAN ASSISTANT

## 2023-11-14 RX ORDER — LOSARTAN POTASSIUM 25 MG/1
25 TABLET ORAL DAILY
Qty: 90 TABLET | Refills: 1 | Status: SHIPPED | OUTPATIENT
Start: 2023-11-14 | End: 2024-11-13

## 2023-11-14 NOTE — PROGRESS NOTES
"Subjective:      Patient ID: Francisca Pretty is a 89 y.o. female.    Chief Complaint: Hypertension    Patient is new to me, being seen today for HTN.     HTN- losartan 50mg   Not taking medication for several months   Previously on losartan 25mg, increased to 50mg last visit but never got new prescription   Was told it was very high at recent dental visit, would not treat her     Reports intermittent cramping  Admits does not drink adequate water     Last visit May 2023 with PCP      Review of Systems   Constitutional:  Positive for fatigue (ongoing for 2-3yrs). Negative for chills, diaphoresis and fever.   HENT:  Negative for congestion, rhinorrhea and sore throat.    Respiratory:  Negative for cough, shortness of breath and wheezing.    Gastrointestinal:  Negative for abdominal pain, constipation, diarrhea, nausea and vomiting.   Musculoskeletal:         Cramping to legs and hands   Skin:  Negative for rash.   Neurological:  Negative for dizziness, light-headedness and headaches.       Objective:   BP (!) 140/90   Pulse 87   Temp 97.6 °F (36.4 °C) (Tympanic)   Resp 18   Ht 5' 6" (1.676 m)   Wt 86.1 kg (189 lb 13.1 oz)   SpO2 99%   BMI 30.64 kg/m²   Physical Exam  Constitutional:       General: She is not in acute distress.     Appearance: Normal appearance. She is well-developed. She is not ill-appearing.   HENT:      Head: Normocephalic and atraumatic.   Cardiovascular:      Rate and Rhythm: Normal rate and regular rhythm.      Heart sounds: Normal heart sounds. No murmur heard.  Pulmonary:      Effort: Pulmonary effort is normal. No respiratory distress.      Breath sounds: Normal breath sounds. No decreased breath sounds.   Musculoskeletal:      Right lower leg: No edema.      Left lower leg: No edema.   Skin:     General: Skin is warm and dry.      Findings: No rash.   Psychiatric:         Speech: Speech normal.         Behavior: Behavior normal.         Thought Content: Thought content normal. "       Assessment:      1. Essential hypertension       Plan:   Essential hypertension  -     losartan (COZAAR) 25 MG tablet; Take 1 tablet (25 mg total) by mouth once daily.  Dispense: 90 tablet; Refill: 1      Ensure adequate hydration, consider labs if cramping persists (declines at this time)     Restart losartan 25mg   Monitor BP at home  1mth f/u

## 2023-12-22 DIAGNOSIS — G47.62 NOCTURNAL LEG CRAMPS: ICD-10-CM

## 2023-12-22 RX ORDER — PRAMIPEXOLE DIHYDROCHLORIDE 0.25 MG/1
0.25 TABLET ORAL NIGHTLY
Qty: 90 TABLET | Refills: 1 | Status: SHIPPED | OUTPATIENT
Start: 2023-12-22 | End: 2024-12-21

## 2023-12-22 NOTE — TELEPHONE ENCOUNTER
Refill Routing Note   Medication(s) are not appropriate for processing by [facility] for the following reason(s):        Outside of protocol    [facility] action(s):  Route               Appointments  past 12m or future 3m with PCP    Date Provider   Last Visit   5/30/2023 Fermín Serrano MD   Next Visit   1/3/2024 Fermín Serrano MD   ED visits in past 90 days: 0        Note composed:8:47 AM 12/22/2023

## 2024-01-03 ENCOUNTER — OFFICE VISIT (OUTPATIENT)
Dept: INTERNAL MEDICINE | Facility: CLINIC | Age: 89
End: 2024-01-03
Payer: MEDICARE

## 2024-01-03 VITALS
HEART RATE: 83 BPM | WEIGHT: 183.19 LBS | DIASTOLIC BLOOD PRESSURE: 86 MMHG | TEMPERATURE: 98 F | OXYGEN SATURATION: 99 % | BODY MASS INDEX: 29.44 KG/M2 | SYSTOLIC BLOOD PRESSURE: 140 MMHG | HEIGHT: 66 IN

## 2024-01-03 DIAGNOSIS — G47.62 NOCTURNAL LEG CRAMPS: ICD-10-CM

## 2024-01-03 DIAGNOSIS — I10 ESSENTIAL HYPERTENSION: Primary | ICD-10-CM

## 2024-01-03 DIAGNOSIS — I77.1 TORTUOUS AORTA: ICD-10-CM

## 2024-01-03 PROCEDURE — 99213 OFFICE O/P EST LOW 20 MIN: CPT | Mod: S$PBB,,, | Performed by: FAMILY MEDICINE

## 2024-01-03 PROCEDURE — 99999 PR PBB SHADOW E&M-EST. PATIENT-LVL III: CPT | Mod: PBBFAC,,, | Performed by: FAMILY MEDICINE

## 2024-01-03 PROCEDURE — 99213 OFFICE O/P EST LOW 20 MIN: CPT | Mod: PBBFAC | Performed by: FAMILY MEDICINE

## 2024-01-03 NOTE — PROGRESS NOTES
Subjective:       Patient ID: Francisca Pretty is a 89 y.o. female.    Chief Complaint: Follow-up (BP)    Follow-up hypertension elevated blood pressure and nocturnal leg cramps.  She denies chest pain palpitations shortness breath or edema.  Mirapex is controlling nocturnal leg cramps.  She is currently on losartan 25 mg a day.    Follow-up  Pertinent negatives include no abdominal pain, chest pain, chills, coughing, fatigue, fever, headaches or weakness.     Review of Systems   Constitutional:  Negative for activity change, appetite change, chills, fatigue, fever and unexpected weight change.   Respiratory:  Negative for cough, chest tightness, shortness of breath and wheezing.    Cardiovascular:  Negative for chest pain, palpitations and leg swelling.   Gastrointestinal:  Negative for abdominal distention and abdominal pain.   Neurological:  Negative for dizziness, weakness, light-headedness and headaches.       Objective:      Physical Exam  Constitutional:       General: She is not in acute distress.     Appearance: She is not ill-appearing or diaphoretic.   Cardiovascular:      Rate and Rhythm: Normal rate and regular rhythm.      Heart sounds: No murmur heard.     No gallop.   Pulmonary:      Effort: Pulmonary effort is normal. No respiratory distress.      Breath sounds: No wheezing, rhonchi or rales.   Abdominal:      General: There is no distension.   Lymphadenopathy:      Cervical: No cervical adenopathy.   Neurological:      Mental Status: She is alert.   Psychiatric:         Mood and Affect: Mood normal.         Behavior: Behavior normal.         Lab Visit on 07/12/2023   Component Date Value Ref Range Status    TSH 07/12/2023 3.505  0.400 - 4.000 uIU/mL Final    Free T4 07/12/2023 0.86  0.71 - 1.51 ng/dL Final     Assessment:       1. Essential hypertension    2. Tortuous aorta    3. Nocturnal leg cramps        Plan:   Blood pressure 140/86.  Will continue losartan 25 mg a day.  Discussed need to  increase walking.  Continue home blood pressure monitor follow-up in 6 months  creatinine is normal.  She has tortuous aorta.  This has been medically treated.      Essential hypertension    Tortuous aorta    Nocturnal leg cramps

## 2024-01-11 DIAGNOSIS — Z00.00 ENCOUNTER FOR MEDICARE ANNUAL WELLNESS EXAM: ICD-10-CM

## 2024-05-14 DIAGNOSIS — I10 ESSENTIAL HYPERTENSION: ICD-10-CM

## 2024-05-14 NOTE — TELEPHONE ENCOUNTER
Abdominal Pain: Care Instructions  Your Care Instructions    Abdominal pain has many possible causes. Some aren't serious and get better on their own in a few days. Others need more testing and treatment. If your pain continues or gets worse, you need to be rechecked and may need more tests to find out what is wrong. You may need surgery to correct the problem. Don't ignore new symptoms, such as fever, nausea and vomiting, urination problems, pain that gets worse, and dizziness. These may be signs of a more serious problem. Your doctor may have recommended a follow-up visit in the next 8 to 12 hours. If you are not getting better, you may need more tests or treatment. The doctor has checked you carefully, but problems can develop later. If you notice any problems or new symptoms, get medical treatment right away. Follow-up care is a key part of your treatment and safety. Be sure to make and go to all appointments, and call your doctor if you are having problems. It's also a good idea to know your test results and keep a list of the medicines you take. How can you care for yourself at home? · Rest until you feel better. · To prevent dehydration, drink plenty of fluids, enough so that your urine is light yellow or clear like water. Choose water and other caffeine-free clear liquids until you feel better. If you have kidney, heart, or liver disease and have to limit fluids, talk with your doctor before you increase the amount of fluids you drink. · If your stomach is upset, eat mild foods, such as rice, dry toast or crackers, bananas, and applesauce. Try eating several small meals instead of two or three large ones. · Wait until 48 hours after all symptoms have gone away before you have spicy foods, alcohol, and drinks that contain caffeine. · Do not eat foods that are high in fat. · Avoid anti-inflammatory medicines such as aspirin, ibuprofen (Advil, Motrin), and naproxen (Aleve).  These can cause LOV 1/3/24  NOV 7/10/24   stomach upset. Talk to your doctor if you take daily aspirin for another health problem. When should you call for help? Call 911 anytime you think you may need emergency care. For example, call if:  ? · You passed out (lost consciousness). ? · You pass maroon or very bloody stools. ? · You vomit blood or what looks like coffee grounds. ? · You have new, severe belly pain. ?Call your doctor now or seek immediate medical care if:  ? · Your pain gets worse, especially if it becomes focused in one area of your belly. ? · You have a new or higher fever. ? · Your stools are black and look like tar, or they have streaks of blood. ? · You have unexpected vaginal bleeding. ? · You have symptoms of a urinary tract infection. These may include:  ¨ Pain when you urinate. ¨ Urinating more often than usual.  ¨ Blood in your urine. ? · You are dizzy or lightheaded, or you feel like you may faint. ? Watch closely for changes in your health, and be sure to contact your doctor if:  ? · You are not getting better after 1 day (24 hours). Where can you learn more? Go to http://temo-jeanette.info/. Enter D750 in the search box to learn more about \"Abdominal Pain: Care Instructions. \"  Current as of: March 20, 2017  Content Version: 11.4  © 5051-8206 [employer]. Care instructions adapted under license by [employer] (which disclaims liability or warranty for this information). If you have questions about a medical condition or this instruction, always ask your healthcare professional. [place] [address] any warranty or liability for your use of this information. Learning About Alcohol Misuse  What is alcohol misuse? Alcohol misuse means drinking so much that it causes problems for you or others. Early problems with alcohol can start at home. You may argue with loved ones about how much you're drinking.  Your job may be affected because of drinking. You may drink when it's dangerous or illegal, such as when you drive. Drinking too much for a long time can lead to health conditions like high blood pressure and liver problems. What are the symptoms? Symptoms of alcohol misuse may include:  · Drinking much more than you planned. · Drinking even though it's causing problems for you or others. · Putting yourself in situations where you might get hurt. · Wanting to cut down or stop drinking, but not being able to. · Feeling guilty about how much you're drinking. How is alcohol misuse treated? Getting help for problems with alcohol is up to you. But you don't have to do it alone. There are many people and kinds of treatments to help with alcohol problems. Talking to your doctor is the first step. When you get a doctor's help, treatment for alcohol problems can be safer and quicker. Treatment options can include:  · Treatment programs. Examples are group therapy, one or more types of counseling, and alcohol education. · Medicines. A doctor or counselor can help you know what kinds of medicines might help with cravings. · Free social support groups. These groups include AA (Alcoholics Anonymous) and SMART (Self-Management and Recovery Training). Your doctor can help you decide which type of program is best for you. Follow-up care is a key part of your treatment and safety. Be sure to make and go to all appointments, and call your doctor if you are having problems. It's also a good idea to know your test results and keep a list of the medicines you take. Where can you learn more? Go to http://temo-jeanette.info/. Enter [id number] [id number] in the search box to learn more about \"Learning About Alcohol Misuse. \"  Current as of: November 3, 2016  Content Version: 11.4  © 5489-6745 [employer]. Care instructions adapted under license by [employer] (which disclaims liability or warranty for this information).  If you have questions about a medical condition or this instruction, always ask your healthcare professional. [place] [address] any warranty or liability for your use of this information.

## 2024-05-14 NOTE — TELEPHONE ENCOUNTER
Refill Routing Note   Medication(s) are not appropriate for processing by [facility] for the following reason(s):        Required vitals abnormal: BP (!) 140/86     [facility] action(s):  Defer     Requires labs : Yes    Medication Therapy Plan:  Labs (CMP) due      Appointments  past 12m or future 3m with PCP    Date Provider   Last Visit   1/3/2024 Fermín Serrano MD   Next Visit   7/10/2024 Fermín Serrano MD   ED visits in past 90 days: 0        Note composed:2:29 PM 05/14/2024

## 2024-05-15 RX ORDER — LOSARTAN POTASSIUM 25 MG/1
25 TABLET ORAL DAILY
Qty: 90 TABLET | Refills: 0 | Status: SHIPPED | OUTPATIENT
Start: 2024-05-15

## 2024-06-19 DIAGNOSIS — G47.62 NOCTURNAL LEG CRAMPS: ICD-10-CM

## 2024-06-19 RX ORDER — PRAMIPEXOLE DIHYDROCHLORIDE 0.25 MG/1
0.25 TABLET ORAL NIGHTLY
Qty: 90 TABLET | Refills: 1 | Status: SHIPPED | OUTPATIENT
Start: 2024-06-19

## 2024-06-19 NOTE — TELEPHONE ENCOUNTER
Care Due:                  Date            Visit Type   Department     Provider  --------------------------------------------------------------------------------                                EP -                              PRIMARY      [facility]  Last Visit: 01-      CARE ([facility])   MEDICINE       Fermín Serrano  Next Visit: None Scheduled  None         None Found                                                            Last  Test          Frequency    Reason                     Performed    Due Date  --------------------------------------------------------------------------------    CMP.........  12 months..  losartan.................  05- 05-    [facility] Embedded Care Due Messages. Reference number: [id number].   6/19/2024 12:42:33 AM CDT

## 2024-07-05 ENCOUNTER — OFFICE VISIT (OUTPATIENT)
Dept: INTERNAL MEDICINE | Facility: CLINIC | Age: 89
End: 2024-07-05
Payer: MEDICARE

## 2024-07-05 VITALS
TEMPERATURE: 98 F | OXYGEN SATURATION: 98 % | HEIGHT: 66 IN | SYSTOLIC BLOOD PRESSURE: 132 MMHG | BODY MASS INDEX: 29.87 KG/M2 | HEART RATE: 95 BPM | DIASTOLIC BLOOD PRESSURE: 98 MMHG | RESPIRATION RATE: 18 BRPM | WEIGHT: 185.88 LBS

## 2024-07-05 DIAGNOSIS — I10 PRIMARY HYPERTENSION: Primary | ICD-10-CM

## 2024-07-05 DIAGNOSIS — I77.1 TORTUOUS AORTA: ICD-10-CM

## 2024-07-05 DIAGNOSIS — G47.62 NOCTURNAL LEG CRAMPS: ICD-10-CM

## 2024-07-05 DIAGNOSIS — R19.7 DIARRHEA, UNSPECIFIED TYPE: ICD-10-CM

## 2024-07-05 DIAGNOSIS — E55.9 VITAMIN D DEFICIENCY DISEASE: ICD-10-CM

## 2024-07-05 DIAGNOSIS — I10 ESSENTIAL HYPERTENSION: ICD-10-CM

## 2024-07-05 PROCEDURE — 99999 PR PBB SHADOW E&M-EST. PATIENT-LVL III: CPT | Mod: PBBFAC,,, | Performed by: FAMILY MEDICINE

## 2024-07-05 PROCEDURE — 99213 OFFICE O/P EST LOW 20 MIN: CPT | Mod: PBBFAC | Performed by: FAMILY MEDICINE

## 2024-07-05 RX ORDER — LOSARTAN POTASSIUM 25 MG/1
25 TABLET ORAL DAILY
Qty: 90 TABLET | Refills: 3 | Status: SHIPPED | OUTPATIENT
Start: 2024-07-05

## 2024-07-05 RX ORDER — PRAMIPEXOLE DIHYDROCHLORIDE 0.25 MG/1
0.25 TABLET ORAL NIGHTLY
Qty: 90 TABLET | Refills: 3 | Status: SHIPPED | OUTPATIENT
Start: 2024-07-05

## 2024-07-05 NOTE — PROGRESS NOTES
Subjective:       Patient ID: Francisca Pretty is a 89 y.o. female.    Chief Complaint: Follow-up    Six-month follow-up hypertension vitamin-D deficiency nocturnal leg cramps.  She denies chest pain palpitations shortness breath or edema.  She needs refill on losartan and Mirapex.  She reports blood pressure at is in the 130/90 range.    Follow-up  Pertinent negatives include no abdominal pain, chest pain, congestion, coughing, headaches, nausea or weakness.     Review of Systems   Constitutional:  Negative for activity change, appetite change and unexpected weight change.   HENT:  Positive for hearing loss. Negative for congestion.         Bilateral hearing aids   Respiratory:  Negative for cough, chest tightness, shortness of breath and wheezing.    Cardiovascular:  Negative for chest pain, palpitations and leg swelling.   Gastrointestinal:  Positive for diarrhea. Negative for abdominal distention, abdominal pain, constipation and nausea.        Chronic intermittent diarrhea.  She gets relief with Imodium   Endocrine: Negative for polydipsia and polyuria.   Neurological:  Negative for dizziness, weakness, light-headedness and headaches.       Objective:      Physical Exam  Constitutional:       General: She is not in acute distress.     Appearance: She is not ill-appearing or diaphoretic.   HENT:      Nose: No congestion.   Cardiovascular:      Rate and Rhythm: Normal rate and regular rhythm.      Heart sounds: No murmur heard.     No gallop.   Pulmonary:      Effort: Pulmonary effort is normal. No respiratory distress.      Breath sounds: No wheezing, rhonchi or rales.   Abdominal:      General: There is no distension.      Palpations: Abdomen is soft.      Tenderness: There is no abdominal tenderness.   Lymphadenopathy:      Cervical: No cervical adenopathy.   Skin:     General: Skin is warm and dry.   Neurological:      Mental Status: She is alert.   Psychiatric:         Mood and Affect: Mood normal.          Behavior: Behavior normal.         Lab Visit on 07/12/2023   Component Date Value Ref Range Status    TSH 07/12/2023 3.505  0.400 - 4.000 uIU/mL Final    Free T4 07/12/2023 0.86  0.71 - 1.51 ng/dL Final     Assessment:       1. Primary hypertension    2. Vitamin D deficiency disease    3. Essential hypertension    4. Nocturnal leg cramps    5. Tortuous aorta    6. Diarrhea, unspecified type        Plan:   Chest x-ray with tortuous aorta.  This was discussed with patient.  Discussed possibly increasing losartan dosage.  She wants to continue as is will monitor blood pressure at home.  Routine lab was ordered.  Follow-up in 6 months.      Primary hypertension  -     CBC Auto Differential; Future; Expected date: 07/05/2024  -     Urinalysis; Future; Expected date: 07/05/2024  -     Comprehensive Metabolic Panel; Future; Expected date: 07/05/2024  -     Lipid Panel; Future; Expected date: 07/05/2024  -     TSH; Future; Expected date: 07/05/2024    Vitamin D deficiency disease  -     Vitamin D; Future; Expected date: 07/05/2024    Essential hypertension  -     losartan (COZAAR) 25 MG tablet; Take 1 tablet (25 mg total) by mouth once daily.  Dispense: 90 tablet; Refill: 3    Nocturnal leg cramps  -     pramipexole (MIRAPEX) 0.25 MG tablet; Take 1 tablet (0.25 mg total) by mouth every evening.  Dispense: 90 tablet; Refill: 3    Tortuous aorta    Diarrhea, unspecified type

## 2024-07-08 DIAGNOSIS — E87.5 HYPERKALEMIA: Primary | ICD-10-CM

## 2024-07-24 ENCOUNTER — LAB VISIT (OUTPATIENT)
Dept: LAB | Facility: HOSPITAL | Age: 89
End: 2024-07-24
Attending: FAMILY MEDICINE
Payer: MEDICARE

## 2024-07-24 DIAGNOSIS — E87.5 HYPERKALEMIA: ICD-10-CM

## 2024-07-24 LAB — POTASSIUM SERPL-SCNC: 5.4 MMOL/L (ref 3.5–5.1)

## 2024-07-24 PROCEDURE — 36415 COLL VENOUS BLD VENIPUNCTURE: CPT | Performed by: FAMILY MEDICINE

## 2024-07-24 PROCEDURE — 84132 ASSAY OF SERUM POTASSIUM: CPT | Performed by: FAMILY MEDICINE

## 2024-07-25 DIAGNOSIS — E87.5 HYPERKALEMIA: Primary | ICD-10-CM

## 2025-02-24 DIAGNOSIS — Z00.00 ENCOUNTER FOR MEDICARE ANNUAL WELLNESS EXAM: ICD-10-CM

## 2025-03-13 ENCOUNTER — LAB VISIT (OUTPATIENT)
Dept: LAB | Facility: HOSPITAL | Age: OVER 89
End: 2025-03-13
Attending: FAMILY MEDICINE
Payer: MEDICARE

## 2025-03-13 ENCOUNTER — OFFICE VISIT (OUTPATIENT)
Dept: INTERNAL MEDICINE | Facility: CLINIC | Age: OVER 89
End: 2025-03-13
Payer: MEDICARE

## 2025-03-13 VITALS
HEART RATE: 91 BPM | DIASTOLIC BLOOD PRESSURE: 80 MMHG | OXYGEN SATURATION: 91 % | WEIGHT: 179.44 LBS | SYSTOLIC BLOOD PRESSURE: 134 MMHG | HEIGHT: 66 IN | TEMPERATURE: 98 F | BODY MASS INDEX: 28.84 KG/M2

## 2025-03-13 DIAGNOSIS — Z71.89 ADVANCE DIRECTIVE DISCUSSED WITH PATIENT: ICD-10-CM

## 2025-03-13 DIAGNOSIS — E87.5 HYPERKALEMIA: ICD-10-CM

## 2025-03-13 DIAGNOSIS — R19.7 DIARRHEA, UNSPECIFIED TYPE: ICD-10-CM

## 2025-03-13 DIAGNOSIS — I10 ESSENTIAL HYPERTENSION: Primary | ICD-10-CM

## 2025-03-13 DIAGNOSIS — Z28.39 IMMUNIZATION DEFICIENCY: ICD-10-CM

## 2025-03-13 DIAGNOSIS — N18.30 STAGE 3 CHRONIC KIDNEY DISEASE, UNSPECIFIED WHETHER STAGE 3A OR 3B CKD: ICD-10-CM

## 2025-03-13 DIAGNOSIS — G47.62 NOCTURNAL LEG CRAMPS: ICD-10-CM

## 2025-03-13 LAB — POTASSIUM SERPL-SCNC: 4.7 MMOL/L (ref 3.5–5.1)

## 2025-03-13 PROCEDURE — G2211 COMPLEX E/M VISIT ADD ON: HCPCS | Mod: S$PBB,,, | Performed by: FAMILY MEDICINE

## 2025-03-13 PROCEDURE — 99999 PR PBB SHADOW E&M-EST. PATIENT-LVL III: CPT | Mod: PBBFAC,,, | Performed by: FAMILY MEDICINE

## 2025-03-13 PROCEDURE — 99213 OFFICE O/P EST LOW 20 MIN: CPT | Mod: PBBFAC | Performed by: FAMILY MEDICINE

## 2025-03-13 PROCEDURE — 84132 ASSAY OF SERUM POTASSIUM: CPT | Performed by: FAMILY MEDICINE

## 2025-03-13 PROCEDURE — 99214 OFFICE O/P EST MOD 30 MIN: CPT | Mod: S$PBB,,, | Performed by: FAMILY MEDICINE

## 2025-03-13 PROCEDURE — 36415 COLL VENOUS BLD VENIPUNCTURE: CPT | Performed by: FAMILY MEDICINE

## 2025-03-13 RX ORDER — LOSARTAN POTASSIUM 25 MG/1
25 TABLET ORAL DAILY
Qty: 90 TABLET | Refills: 3 | Status: SHIPPED | OUTPATIENT
Start: 2025-03-13

## 2025-03-13 NOTE — PROGRESS NOTES
Patient ID: Francisca Pretty is a 89 y.o. female.    Chief Complaint: Follow-up    History of Present Illness    Ms. Pretty presents today for follow up    She reports experiencing fatigue and low energy levels, limiting her walking activity. She denies any specific pain or discomfort.    She reports improvement in leg cramps with stretching exercises and bicycle riding, expressing desire to discontinue medication due to effectiveness of these non-pharmacological interventions.    She manages GI symptoms with Imodium 1-2 tablets as needed, occasionally able to skip doses for 1-2 days with good symptom control.    She reports urinary leakage managed with liner use.    She reports progressive tooth loss occurring sequentially.      ROS:  General: -fever, -chills, +fatigue, -weight gain, -weight loss  Eyes: -vision changes, -redness, -discharge  ENT: -ear pain, -nasal congestion, -sore throat  Cardiovascular: -chest pain, -palpitations, -lower extremity edema  Respiratory: -cough, -shortness of breath  Gastrointestinal: -abdominal pain, -nausea, -vomiting, +diarrhea, -constipation, -blood in stool  Genitourinary: -dysuria, -hematuria, -frequency, +urinary incontinence  Musculoskeletal: -joint pain, -muscle pain  Skin: -rash, -lesion  Neurological: -headache, -dizziness, -numbness, -tingling         Physical Exam    General: In no acute distress. Not ill-appearing or diaphoretic.  Neck: Normal thyroid. No cervical lymphadenopathy. 2+ carotid pulses.  Cardiovascular: Normal rate and regular  rhythm. No murmur heard. No gallop.  Pulmonary: Pulmonary effort is normal. No respiratory distress. No wheezing. No rhonchi. No rales.  Abdominal: Soft. Nontender. Nondistended. No mass.  Musculoskeletal: No swelling. No tenderness. No deformity.  Neurological: Alert.  Psychiatric: Mood normal. Behavior normal.         Assessment & Plan    CHRONIC KIDNEY DISEASE, STAGE 3A:  - Noted patient's reports of fatigue and low  energy.  - Reviewed recent lab results, revealing elevated potassium levels.  - Ordered a repeat potassium blood test for recheck.  - Refilled blood pressure medication and advised patient to report any worsening symptoms or side effects.    HYPERTENSION:  - Assessed patient's current medication regimen and performed a basic physical exam, including heart and lung assessment.  - Instructed patient to continue taking medication as prescribed.    FUNCTIONAL DIARRHEA:  - Evaluated effectiveness of Imodium for managing GI symptoms.  - Ms. Pretty reports it is working well.  - Advised to continue using Imodium as needed and report any changes in symptoms or medication effectiveness.    WEAKNESS AND FATIGUE:  - Evaluated patient's overall health status.  - Advised patient to monitor energy levels and report any significant changes.    LEG CRAMPS:  - Noted improvement with stretching exercises and bicycle riding.  - Discontinued medication due to reported improvement with lifestyle changes.  - Encouraged patient to continue non-pharmacological management through exercise and stretching.    URINARY INCONTINENCE:  - Acknowledged patient's reports of urinary leakage and use of liner for management.  - Discussed urinary incontinence as a common problem.  - Advised to continue using liner and report any worsening of symptoms or need for additional interventions.    DENTAL HEALTH:  - Noted patient's ongoing loss of teeth.  - Advised to maintain good oral hygiene and follow up with a dentist for comprehensive dental care.    ABNORMAL BLOOD CHEMISTRY:  - Instructed patient on the importance of follow-up testing and adherence to medication regimen.  - Advised to report any new symptoms or concerns related to electrolyte imbalance.          Rechecked potassium she declined Tdap and RSV.  Blood pressure controlled refill medication follow-up in 6 months advance directive discussed with her per her request.  She bring form home to  complete with her  son    Follow up in about 6 months (around 9/13/2025).    This note was generated with the assistance of ambient listening technology. Verbal consent was obtained by the patient and accompanying visitor(s) for the recording of patient appointment to facilitate this note. I attest to having reviewed and edited the generated note for accuracy, though some syntax or spelling errors may persist. Please contact the author of this note for any clarification.

## 2025-03-14 ENCOUNTER — RESULTS FOLLOW-UP (OUTPATIENT)
Dept: INTERNAL MEDICINE | Facility: CLINIC | Age: OVER 89
End: 2025-03-14

## 2025-08-05 ENCOUNTER — TELEPHONE (OUTPATIENT)
Dept: INTERNAL MEDICINE | Facility: CLINIC | Age: OVER 89
End: 2025-08-05
Payer: MEDICARE

## 2025-08-05 NOTE — TELEPHONE ENCOUNTER
Called and spoke to pt and assisted with scheduling.     Copied from CRM #[id number]. Topic: Appointments - Appointment Access  >> Aug 5, 2025 11:27 AM Adriane wrote:  Type: Appointment Access (office must schedule)    Who Called: Francisca  Reason for Visit: 6 mth ck up  When does the patient need to be seen?: first avail  Next Available Appointment:   Would the patient rather a call back or a response via [facility]? call back   Best Call Back Number: [phone]  Additional Information:

## 2025-08-05 NOTE — TELEPHONE ENCOUNTER
Copied from CRM #[id number]. Topic: General Inquiry - Return Call  >> Aug 5, 2025 11:44 AM Lilian wrote:  Type:  Patient Returning Call    Who Called:--PT Francisca--    Who Left Message for Patient:--Zakia--    Does the patient know what this is regarding?:--Appointment--    Would the patient rather a call back or a response via [facility]? --Call back--    Best Call Back Number:[phone]    Additional Information: I tried to schedule, but nothing pulling up. Please call to advise.